# Patient Record
Sex: FEMALE | Race: WHITE | NOT HISPANIC OR LATINO | Employment: FULL TIME
[De-identification: names, ages, dates, MRNs, and addresses within clinical notes are randomized per-mention and may not be internally consistent; named-entity substitution may affect disease eponyms.]

---

## 2018-06-25 ENCOUNTER — RECORDS - HEALTHEAST (OUTPATIENT)
Dept: ADMINISTRATIVE | Facility: OTHER | Age: 33
End: 2018-06-25

## 2018-08-09 ENCOUNTER — COMMUNICATION - HEALTHEAST (OUTPATIENT)
Dept: TELEHEALTH | Facility: CLINIC | Age: 33
End: 2018-08-09

## 2018-08-09 ENCOUNTER — HOSPITAL ENCOUNTER (OUTPATIENT)
Dept: RADIOLOGY | Facility: HOSPITAL | Age: 33
Discharge: HOME | End: 2018-08-09
Attending: OBSTETRICS & GYNECOLOGY | Admitting: RADIOLOGY

## 2018-08-09 DIAGNOSIS — N97.9 INFERTILITY, FEMALE: ICD-10-CM

## 2018-11-09 ASSESSMENT — MIFFLIN-ST. JEOR: SCORE: 1613.94

## 2018-11-12 ENCOUNTER — ANESTHESIA - HEALTHEAST (OUTPATIENT)
Dept: SURGERY | Facility: AMBULATORY SURGERY CENTER | Age: 33
End: 2018-11-12

## 2018-11-13 ENCOUNTER — SURGERY - HEALTHEAST (OUTPATIENT)
Dept: SURGERY | Facility: AMBULATORY SURGERY CENTER | Age: 33
End: 2018-11-13

## 2018-11-13 ASSESSMENT — MIFFLIN-ST. JEOR: SCORE: 1613.94

## 2020-08-04 ENCOUNTER — TRANSCRIBE ORDERS (OUTPATIENT)
Dept: MATERNAL FETAL MEDICINE | Facility: CLINIC | Age: 35
End: 2020-08-04

## 2020-08-04 DIAGNOSIS — O26.90 PREGNANCY RELATED CONDITION: Primary | ICD-10-CM

## 2020-08-12 ENCOUNTER — TRANSFERRED RECORDS (OUTPATIENT)
Dept: HEALTH INFORMATION MANAGEMENT | Facility: CLINIC | Age: 35
End: 2020-08-12

## 2020-09-21 ENCOUNTER — TRANSFERRED RECORDS (OUTPATIENT)
Dept: HEALTH INFORMATION MANAGEMENT | Facility: CLINIC | Age: 35
End: 2020-09-21

## 2020-09-22 ENCOUNTER — TRANSCRIBE ORDERS (OUTPATIENT)
Dept: MATERNAL FETAL MEDICINE | Facility: CLINIC | Age: 35
End: 2020-09-22

## 2020-09-22 ENCOUNTER — MEDICAL CORRESPONDENCE (OUTPATIENT)
Dept: HEALTH INFORMATION MANAGEMENT | Facility: CLINIC | Age: 35
End: 2020-09-22

## 2020-09-22 DIAGNOSIS — O26.90 PREGNANCY RELATED CONDITION, ANTEPARTUM: Primary | ICD-10-CM

## 2020-09-24 ENCOUNTER — PRE VISIT (OUTPATIENT)
Dept: MATERNAL FETAL MEDICINE | Facility: CLINIC | Age: 35
End: 2020-09-24

## 2020-09-28 ENCOUNTER — OFFICE VISIT (OUTPATIENT)
Dept: MATERNAL FETAL MEDICINE | Facility: CLINIC | Age: 35
End: 2020-09-28
Attending: OBSTETRICS & GYNECOLOGY
Payer: COMMERCIAL

## 2020-09-28 DIAGNOSIS — O26.90 PREGNANCY RELATED CONDITION, ANTEPARTUM: ICD-10-CM

## 2020-09-28 PROCEDURE — 40000072 ZZH STATISTIC GENETIC COUNSELING, < 16 MIN: Mod: ZF | Performed by: GENETIC COUNSELOR, MS

## 2020-09-28 NOTE — PROGRESS NOTES
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       Impression/Plan:   Saundra was seen for a genetic counseling consultation only today to discuss her pregnancy history as well as her positive carrier screening results. Today we coordinated partner carrier screening through [employer] for her , Luis, which is expected in 2-3 weeks. I will plan to call Saundra to discuss these results and determine if any additional testing (such as prenatal diagnosis) is warranted or desired. Luis provided written permission for me to discuss his results with Saundra.     Pregnancy History:   /Parity:                            Age at Delivery: 35 year old  JEVON: 3/17/2021, by Last Menstrual Period                  Gestational Age: 15w5d  -  No significant complications or exposures were reported in the current pregnancy.  -  Saundra s pregnancy history is significant for:    Two previous miscarriages between 6 and 8 weeks. Saundra had testing on products of conception for the second pregnancy. She reports that the the pregnancy had a chromosome condition, though she is not sure which condition. However, she remembers being told that the finding indicated something sporadic and that it would not be likely to recur. We do not have access to these records.     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 Due to this history, Saundra was initially taking lovenox, but developed a subchorionic hemorrhage. Now, she will be taking baby aspirin for the remainder of the pregnancy.        Family History:   A three-generation pedigree was obtained, and is scanned under the  Media  tab.   The following significant findings  were reported by Saundra:    Saundra's maternal uncle has multiple sclerosis which was diagnosed in his 30s. Multiple sclerosis (MS) is an autoimmune disease of the central nervous system characterized by focal inflammation, demyelination, and axonal injury. MS is thought to be a multifactorial, meaning caused by a combination of environmental and genetic factors. Although we can see multifactorial conditions recur in families, we are unlikely to be able to identify a specific underlying cause. We discussed that this history likely does not increase the chance that Saundra or the pregnancy would develop this condition.       Saundra's , Luis, reports that he has a paternal aunt who recently had to undergo a cardiac valve replacement. She is in her 70s. We discussed that this history would not impact how we manage the pregnancy. Heart conditions are unfortunately very common as people age and unlikely to have an underlying genetic cause unless there are multiple people affected, especially at young ages.     Otherwise, the reported family history is negative for multiple miscarriages, stillbirths, birth defects, intellectual disability, known genetic conditions, and consanguinity.       Carrier Screening:   The patient reports that she and the father of the pregnancy have  ancestry:     Cystic fibrosis is an autosomal recessive genetic condition that occurs with increased frequency in individuals of  ancestry and carrier screening for this condition is available.  In addition,  screening in the [facility] includes cystic fibrosis.      Expanded carrier screening for mutations in a large panel of genes associated with autosomal recessive conditions including cystic fibrosis, spinal muscular atrophy, and others, is now available.      The patient has had previous carrier screening for 144 conditions ([employer]herit[employer] Comprehensive), the results of which were positive for Owens variant  galactosemia.  A copy of the report was available for our review today.    Saundra was found to carry one GALT variant. The variant she carries is associated with the Owens variant form of galactosemia. Classic galactosemia is an autosomal recessive metabolic disorder. Signs and symptoms of untreated classic galactosemia include diarrhea, poor feeing, failure to thrive, jaundice, hepatomegaly, sepsis, lethargy, and bleeding tendencies. However, early management through diet (removing breast milk) can resolve or prevent these symptoms when initiated soon after birth.     Individuals with Owens variant galactosemia, on the other hand, are typically asymptomatic. Infants with Owens variant galactosemia who receive breast milk or a high galactose-containing formula (dairy milk-based formula) are typically asymptomatic and show the same prevalence of acute issues seen in the general  population. Long-term effects from galactosemia, such as premature ovarian failure, are NOT associated with Owens variant galactosemia.     Individuals with Owens variant galactosemia may have two Owens variants or a Owens variant and a classic pathogenic variant. Babies born with Owens variant galactosemia may screen positive on  screening (NBS) due to decreased enzyme activity. However, these babies will not be at risk for classic galactosemia.    Saundra and Luis wanted to proceed with partner testing today. We discussed that if Luis screens positive for either a Owens variant or a classic pathogenic variant, their pregnancy will have a 25% chance of having Owens variant galactosemia and screening positive on NBS. The pregnancy would also have a 50% chance to be a carrier for one of the variants and a 25% chance of carrying neither variant.        Risk Assessment:   We explained that the risk for fetal chromosome abnormalities increases with maternal age. We discussed specific features of common chromosome  abnormalities, including Down syndrome, trisomy 13, trisomy 18, and sex chromosome trisomies.      - At age 35 at midtrimester, the risk to have a baby with Down syndrome is 1 in 274.     - At age 35 at midtrimester, the risk to have a baby with any chromosome abnormality is 1 in 135.     The patient had a Non-Invasive Prenatal Test (NIPT) ([id number] Plus) earlier in pregnancy. The results were negative for trisomy 21, 18, and 13. Sex chromosome abnormalities were not detected. Common microdeletions were not detected. Trisomy 16 and trisomy 22 were not detected.     We discussed that these results are reassuring for those conditions screened. Without information about the specific chromosome condition in Saundra's previous pregnancy, it is difficult to give precise recurrence risk. NIPT has decreased the suspicion for any of the most common aneuploidies. The less common aneuploidies, such as trisomy 8 and trisomy 14, are common causes of early miscarriage. These aneuploidies are not expected to be viable and as such, it is unlikely that Saundra's current pregnancy is affected by one of those conditions. We discussed that additional screening and testing, including ultrasounds and diagnostic testing, remain available to Saundra if she desires more definitive information.     Saundra is anxious about residual risk and asked for written information regarding amniocentesis. We also discussed her upcoming comprehensive ultrasound in depth, including specific soft markers that will be screened.       Testing Options:   We discussed the following options:   Genetic Amniocentesis    Invasive procedure typically performed in the second trimester by which amniotic fluid is obtained for the purpose of chromosome analysis and/or other prenatal genetic analysis    Diagnostic results; >99% sensitivity for fetal chromosome abnormalities    AFAFP measurement tests for open neural tube defects       Comprehensive (Level II) ultrasound:  Detailed ultrasound performed between 18-22 weeks gestation to screen for major birth defects and markers for aneuploidy.      We reviewed the benefits and limitations of this testing.  Screening tests provide a risk assessment specific to the pregnancy for certain fetal chromosome abnormalities, but cannot definitively diagnose or exclude a fetal chromosome abnormality.  Follow-up genetic counseling and consideration of diagnostic testing is recommended with any abnormal screening result.     Diagnostic tests carry inherent risks- including risk of miscarriage- that require careful consideration.  These tests can detect fetal chromosome abnormalities with greater than 99% certainty.  Results can be compromised by maternal cell contamination or mosaicism, and are limited by the resolution of cytogenetic G-banding technology.  There is no screening nor diagnostic test that can detect all forms of birth defects or mental disability.    It was a pleasure to be involved with Saundra s care. Face-to-face time of the meeting was 45 minutes.    Gloria Pearson MS, [facility]  Certified Genetic Counselor  [facility]  Maternal Fetal Medicine  [email or web address]  [phone]    Patient seen, evaluated and discussed with the Genetic Counseling Intern. I have verified the content of the note, which accurately reflects my assessment of the patient and the plan of care.  Supervising Genetic Counselor    Rosetta Freire MS, [facility]  Maternal Fetal Medicine  [facility]  Ph: [phone]  [email or web address]

## 2020-09-29 ENCOUNTER — TRANSFERRED RECORDS (OUTPATIENT)
Dept: HEALTH INFORMATION MANAGEMENT | Facility: CLINIC | Age: 35
End: 2020-09-29

## 2020-10-07 ENCOUNTER — TRANSFERRED RECORDS (OUTPATIENT)
Dept: HEALTH INFORMATION MANAGEMENT | Facility: CLINIC | Age: 35
End: 2020-10-07

## 2020-10-12 ENCOUNTER — TELEPHONE (OUTPATIENT)
Dept: MATERNAL FETAL MEDICINE | Facility: CLINIC | Age: 35
End: 2020-10-12

## 2020-10-12 NOTE — TELEPHONE ENCOUNTER
2020     Spoke with Saundra about Armando's carrier screening results for galactosemia. I met with Armando and Saundra because Saundra was found to be a Owens variant galactosemia carrier. We discussed at length that the pregnancy is not at risk for classic galactosemia. Armando opted to pursue galactosemia carrier screening in order to have the full picture. Armando screened negative and was not found to be a carrier of galactosemia. I discussed with Saundra that the pregnancy is likely not at risk for screening positive on  screening for galactosemia. The pregnancy is not at increased risk for either Owens variant galactosemia OR classic galactosemia.     Gloria Pearson MS, [facility]  Certified Genetic Counselor  [facility]  Maternal Fetal Medicine  [email or web address]  [phone]

## 2020-10-22 ENCOUNTER — HOSPITAL ENCOUNTER (OUTPATIENT)
Dept: ULTRASOUND IMAGING | Facility: CLINIC | Age: 35
Discharge: HOME | End: 2020-10-22
Attending: OBSTETRICS & GYNECOLOGY | Admitting: OBSTETRICS & GYNECOLOGY
Payer: COMMERCIAL

## 2020-10-22 ENCOUNTER — OFFICE VISIT (OUTPATIENT)
Dept: MATERNAL FETAL MEDICINE | Facility: CLINIC | Age: 35
End: 2020-10-22
Attending: OBSTETRICS & GYNECOLOGY
Payer: COMMERCIAL

## 2020-10-22 DIAGNOSIS — O26.90 PREGNANCY RELATED CONDITION, ANTEPARTUM: ICD-10-CM

## 2020-10-22 DIAGNOSIS — O26.90 PREGNANCY RELATED CONDITION: ICD-10-CM

## 2020-10-22 PROCEDURE — 99207 PR NO CHARGE LOS: CPT | Performed by: OBSTETRICS & GYNECOLOGY

## 2020-10-22 PROCEDURE — 76811 OB US DETAILED SNGL FETUS: CPT | Mod: 26 | Performed by: OBSTETRICS & GYNECOLOGY

## 2020-10-22 PROCEDURE — 76811 OB US DETAILED SNGL FETUS: CPT

## 2020-10-22 NOTE — PROGRESS NOTES
Please see ultrasound report under Imaging tab for details of today's ultrasound.    Sunshine Quiroga MD  Maternal-Fetal Medicine

## 2020-11-19 ENCOUNTER — HOSPITAL ENCOUNTER (OUTPATIENT)
Dept: ULTRASOUND IMAGING | Facility: CLINIC | Age: 35
End: 2020-11-19
Attending: OBSTETRICS & GYNECOLOGY
Payer: COMMERCIAL

## 2020-11-19 ENCOUNTER — OFFICE VISIT (OUTPATIENT)
Dept: MATERNAL FETAL MEDICINE | Facility: CLINIC | Age: 35
End: 2020-11-19
Attending: OBSTETRICS & GYNECOLOGY
Payer: COMMERCIAL

## 2020-11-19 DIAGNOSIS — O35.9XX0 FETAL ABNORMALITY AFFECTING MANAGEMENT OF MOTHER, SINGLE OR UNSPECIFIED FETUS: Primary | ICD-10-CM

## 2020-11-19 PROCEDURE — 76816 OB US FOLLOW-UP PER FETUS: CPT | Mod: 26 | Performed by: OBSTETRICS & GYNECOLOGY

## 2020-11-19 PROCEDURE — 76816 OB US FOLLOW-UP PER FETUS: CPT

## 2020-11-19 NOTE — PROGRESS NOTES
Please see full imaging report from [employer] program under imaging tab.      Dwaine Cardoza MD  Maternal Fetal Medicine

## 2020-12-07 ENCOUNTER — TRANSFERRED RECORDS (OUTPATIENT)
Dept: HEALTH INFORMATION MANAGEMENT | Facility: CLINIC | Age: 35
End: 2020-12-07

## 2020-12-08 ENCOUNTER — MEDICAL CORRESPONDENCE (OUTPATIENT)
Dept: HEALTH INFORMATION MANAGEMENT | Facility: CLINIC | Age: 35
End: 2020-12-08

## 2020-12-11 ENCOUNTER — HOSPITAL ENCOUNTER (OUTPATIENT)
Dept: CARDIOLOGY | Facility: CLINIC | Age: 35
Discharge: HOME | End: 2020-12-11
Attending: OBSTETRICS & GYNECOLOGY | Admitting: OBSTETRICS & GYNECOLOGY
Payer: COMMERCIAL

## 2020-12-11 DIAGNOSIS — R06.02 SOB (SHORTNESS OF BREATH): ICD-10-CM

## 2020-12-11 DIAGNOSIS — Z3A.25 25 WEEKS GESTATION OF PREGNANCY: ICD-10-CM

## 2020-12-11 PROBLEM — I82.819 SUPERFICIAL THROMBOSIS OF LEG: Status: ACTIVE | Noted: 2020-12-11

## 2020-12-11 PROBLEM — N94.3 PMS (PREMENSTRUAL SYNDROME): Status: ACTIVE | Noted: 2020-12-11

## 2020-12-11 PROBLEM — N83.209 OVARIAN CYST: Status: ACTIVE | Noted: 2020-12-11

## 2020-12-11 PROBLEM — F41.9 ANXIETY: Status: ACTIVE | Noted: 2020-12-11

## 2020-12-11 PROBLEM — G43.909 MIGRAINE: Status: ACTIVE | Noted: 2020-12-11

## 2020-12-11 PROBLEM — E78.5 HYPERLIPIDEMIA LDL GOAL <130: Status: ACTIVE | Noted: 2020-12-11

## 2020-12-11 PROCEDURE — 93306 TTE W/DOPPLER COMPLETE: CPT

## 2020-12-11 PROCEDURE — 93306 TTE W/DOPPLER COMPLETE: CPT | Mod: 26 | Performed by: INTERNAL MEDICINE

## 2020-12-14 ENCOUNTER — OFFICE VISIT (OUTPATIENT)
Dept: CARDIOLOGY | Facility: CLINIC | Age: 35
End: 2020-12-14
Payer: COMMERCIAL

## 2020-12-14 VITALS
BODY MASS INDEX: 38.04 KG/M2 | HEART RATE: 120 BPM | DIASTOLIC BLOOD PRESSURE: 91 MMHG | WEIGHT: 236.7 LBS | HEIGHT: 66 IN | SYSTOLIC BLOOD PRESSURE: 136 MMHG

## 2020-12-14 DIAGNOSIS — E78.5 HYPERLIPIDEMIA LDL GOAL <130: Primary | ICD-10-CM

## 2020-12-14 PROCEDURE — 93005 ELECTROCARDIOGRAM TRACING: CPT | Performed by: INTERNAL MEDICINE

## 2020-12-14 PROCEDURE — 99203 OFFICE O/P NEW LOW 30 MIN: CPT | Performed by: INTERNAL MEDICINE

## 2020-12-14 RX ORDER — ASPIRIN 81 MG/1
81 TABLET ORAL DAILY
COMMUNITY

## 2020-12-14 RX ORDER — CETIRIZINE HYDROCHLORIDE 10 MG/1
10 TABLET ORAL DAILY
COMMUNITY

## 2020-12-14 RX ORDER — LANSOPRAZOLE 30 MG/1
30 CAPSULE, DELAYED RELEASE ORAL DAILY
COMMUNITY

## 2020-12-14 RX ORDER — ERGOCALCIFEROL 1.25 MG/1
4000 CAPSULE, LIQUID FILLED ORAL DAILY
COMMUNITY

## 2020-12-14 RX ORDER — FLUTICASONE PROPIONATE 50 MCG
1 SPRAY, SUSPENSION (ML) NASAL DAILY
COMMUNITY

## 2020-12-14 RX ORDER — SERTRALINE HYDROCHLORIDE 25 MG/1
25 TABLET, FILM COATED ORAL DAILY
COMMUNITY

## 2020-12-14 RX ORDER — LANOLIN ALCOHOL/MO/W.PET/CERES
1000 CREAM (GRAM) TOPICAL DAILY
COMMUNITY

## 2020-12-14 ASSESSMENT — MIFFLIN-ST. JEOR: SCORE: 1777.47

## 2020-12-14 NOTE — LETTER
12/14/2020    Stacie Glez MD  [facility] [address] [place] [address]  [facility] [id number]    RE: Saundra Cronin       Dear Colleague,    I had the pleasure of seeing Saundra Cronin in the [facility].    HPI and Plan:   Today I had the pleasure of seeing Saundra Cronin at [facility]. She is a pleasant 35 year old patient with a past medical history of migraines, currently pregnant who presents to the clinic in consultation for shortness of breath.  The patient is a 25 weeks pregnant with her first baby girl.    She tells me that over the last 10 days she has noticed significant shortness of breath which is around-the-clock.  This is also made worse by extreme anxiety that she has developed.  She wakes up at night feeling anxious and has to either watch TV for long.  Time of just walk around in the house to feel better.  She started Zoloft few days ago and has definitely noticed an improvement in her symptoms since.  Last 2 days she was able to sleep better.  She realizes that her symptoms are most likely secondary to being seen very anxious.  Even in clinic today she feels very nervous and anxious.  She says that her heart rate in clinic is elevated because she is very nervous about our encounter.      Echocardiogram performed recently showed normal biventricular size and systolic function and no valvular disease.    Assessment and plan  1.  Severe anxiety-improvement in symptoms after starting Zoloft  2.  Shortness of breath secondary to 1    I provided reassurance and told the patient that her echocardiogram is completely normal and shows no evidence of structural or valvular heart disease.  She was relieved to know that.  I told her to consider things like medication in addition to taking medications.  She has not returned for medication since that it helps to calm her down and improve her symptoms.  I also told her to stay well-hydrated and drink  enough water.  I believe we need to do anything further at this time.  We will have her come back and see us on as-needed basis.  If her symptoms do not improve after termination of pregnancy I will undertake comprehensive work-up.  But for now we will hold off on further testing.    Thank you for allowing me to participate in the care of Saundra Cronin    This note was completed in part using Dragon voice recognition software. Although reviewed after completion, some word and grammatical errors may occur.    Ivan Martinez MD  Cardiology    Orders Placed This Encounter   Procedures     EKG 12-lead complete w/read - Clinics (performed today)       Orders Placed This Encounter   Medications     vitamin D2 (ERGOCALCIFEROL) 15816 units (1250 mcg) capsule     Sig: Take 4,000 Units by mouth daily     cyanocobalamin (VITAMIN B-12) 1000 MCG tablet     Sig: Take 1,000 mcg by mouth daily     cetirizine (ZYRTEC) 10 MG tablet     Sig: Take 10 mg by mouth daily     fluticasone (FLONASE) 50 MCG/ACT nasal spray     Sig: Spray 1 spray into both nostrils daily     aspirin 81 MG EC tablet     Sig: Take 81 mg by mouth daily     LANsoprazole (PREVACID) 30 MG DR capsule     Sig: Take 30 mg by mouth daily     sertraline (ZOLOFT) 25 MG tablet     Sig: Take 25 mg by mouth daily       There are no discontinued medications.    Encounter Diagnosis   Name Primary?     Hyperlipidemia LDL goal <130 Yes       CURRENT MEDICATIONS:  Current Outpatient Medications   Medication Sig Dispense Refill     aspirin 81 MG EC tablet Take 81 mg by mouth daily       cetirizine (ZYRTEC) 10 MG tablet Take 10 mg by mouth daily       cyanocobalamin (VITAMIN B-12) 1000 MCG tablet Take 1,000 mcg by mouth daily       fluticasone (FLONASE) 50 MCG/ACT nasal spray Spray 1 spray into both nostrils daily       LANsoprazole (PREVACID) 30 MG DR capsule Take 30 mg by mouth daily       sertraline (ZOLOFT) 25 MG tablet Take 25 mg by mouth daily       vitamin D2  (ERGOCALCIFEROL) 48494 units (1250 mcg) capsule Take 4,000 Units by mouth daily         ALLERGIES   Not on File    PAST MEDICAL HISTORY:  Past Medical History:   Diagnosis Date     Anxiety 12/11/2020     Hyperlipidemia LDL goal <130 12/11/2020     Migraine 12/11/2020     Superficial thrombosis of leg 12/11/2020       PAST SURGICAL HISTORY:  Past Surgical History:   Procedure Laterality Date     DILATION AND CURETTAGE       TONSILLECTOMY         FAMILY HISTORY:  No family history on file.    SOCIAL HISTORY:  Social History     Socioeconomic History     Marital status:      Spouse name: None     Number of children: None     Years of education: None     Highest education level: None   Occupational History     None   Social Needs     Financial resource strain: None     Food insecurity     Worry: None     Inability: None     Transportation needs     Medical: None     Non-medical: None   Tobacco Use     Smoking status: Never Smoker     Smokeless tobacco: Never Used   Substance and Sexual Activity     Alcohol use: Not Currently     Drug use: Never     Sexual activity: None   Lifestyle     Physical activity     Days per week: None     Minutes per session: None     Stress: None   Relationships     Social connections     Talks on phone: None     Gets together: None     Attends Restorationist service: None     Active member of club or organization: None     Attends meetings of clubs or organizations: None     Relationship status: None     Intimate partner violence     Fear of current or ex partner: None     Emotionally abused: None     Physically abused: None     Forced sexual activity: None   Other Topics Concern     None   Social History Narrative     None       Review of Systems:  Skin:  Negative       Eyes:  Negative      ENT:  Negative      Respiratory:  Negative       Cardiovascular:    Positive for;edema    Gastroenterology: Negative      Genitourinary:  Negative      Musculoskeletal:  Negative      Neurologic:   "Positive for numbness or tingling of hands(tingling in arms)    Psychiatric:  Positive for anxiety    Heme/Lymph/Imm:  Negative      Endocrine:  Negative        Physical Exam:  Vitals: BP (!) 136/91   Pulse 120   Ht 1.664 m (5' 5.5\")   Wt 107.4 kg (236 lb 11.2 oz)   LMP 06/10/2020   BMI 38.79 kg/m    Constitutional: awake, alert, no distress  Head: Normocephalic, atraumatic  Eyes: Conjunctivae and lids unremarkable, sclera white  ENT: No pallor or cyanosis  Respiratory: Good chest movement, no distress  Cardiac: Regular rate and rhythm, S1-S2 normal. No murmurs gallops or rubs. No pedal edema.   Extremities and musculoskeletal: No gross motor deficit  Neurological.  Affect normal  Psych: Alert and oriented x3    Recent Lab Results:  LIPID RESULTS:  No results found for: CHOL, HDL, LDL, TRIG, CHOLHDLRATIO    LIVER ENZYME RESULTS:  No results found for: AST, ALT    CBC RESULTS:  No results found for: WBC, RBC, HGB, HCT, MCV, MCH, MCHC, RDW, PLT    BMP RESULTS:  No results found for: NA, POTASSIUM, CHLORIDE, CO2, ANIONGAP, GLC, BUN, CR, GFRESTIMATED, GFRESTBLACK, JESUS     A1C RESULTS:  No results found for: A1C    INR RESULTS:  No results found for: INR      All medical records were reviewed in detail and discussed with the patient. Greater than 30 mins were spent with the patient, 50% of this time was spent on counseling and coordination of care.  After visit summary was printed and given to the patient.      Thank you for allowing me to participate in the care of your patient.    Sincerely,     Ivan Martinez MD     [facility]  "

## 2020-12-14 NOTE — PROGRESS NOTES
HPI and Plan:   Today I had the pleasure of seeing Saundra Cronin at [facility]. She is a pleasant 35 year old patient with a past medical history of migraines, currently pregnant who presents to the clinic in consultation for shortness of breath.  The patient is a 25 weeks pregnant with her first baby girl.    She tells me that over the last 10 days she has noticed significant shortness of breath which is around-the-clock.  This is also made worse by extreme anxiety that she has developed.  She wakes up at night feeling anxious and has to either watch TV for long.  Time of just walk around in the house to feel better.  She started Zoloft few days ago and has definitely noticed an improvement in her symptoms since.  Last 2 days she was able to sleep better.  She realizes that her symptoms are most likely secondary to being seen very anxious.  Even in clinic today she feels very nervous and anxious.  She says that her heart rate in clinic is elevated because she is very nervous about our encounter.      Echocardiogram performed recently showed normal biventricular size and systolic function and no valvular disease.    Assessment and plan  1.  Severe anxiety-improvement in symptoms after starting Zoloft  2.  Shortness of breath secondary to 1    I provided reassurance and told the patient that her echocardiogram is completely normal and shows no evidence of structural or valvular heart disease.  She was relieved to know that.  I told her to consider things like medication in addition to taking medications.  She has not returned for medication since that it helps to calm her down and improve her symptoms.  I also told her to stay well-hydrated and drink enough water.  I believe we need to do anything further at this time.  We will have her come back and see us on as-needed basis.  If her symptoms do not improve after termination of pregnancy I will undertake comprehensive work-up.  But for now  we will hold off on further testing.    Thank you for allowing me to participate in the care of Saundra Cronin    This note was completed in part using Dragon voice recognition software. Although reviewed after completion, some word and grammatical errors may occur.    Ivan Martinez MD  Cardiology    Orders Placed This Encounter   Procedures     EKG 12-lead complete w/read - Clinics (performed today)       Orders Placed This Encounter   Medications     vitamin D2 (ERGOCALCIFEROL) 92078 units (1250 mcg) capsule     Sig: Take 4,000 Units by mouth daily     cyanocobalamin (VITAMIN B-12) 1000 MCG tablet     Sig: Take 1,000 mcg by mouth daily     cetirizine (ZYRTEC) 10 MG tablet     Sig: Take 10 mg by mouth daily     fluticasone (FLONASE) 50 MCG/ACT nasal spray     Sig: Spray 1 spray into both nostrils daily     aspirin 81 MG EC tablet     Sig: Take 81 mg by mouth daily     LANsoprazole (PREVACID) 30 MG DR capsule     Sig: Take 30 mg by mouth daily     sertraline (ZOLOFT) 25 MG tablet     Sig: Take 25 mg by mouth daily       There are no discontinued medications.    Encounter Diagnosis   Name Primary?     Hyperlipidemia LDL goal <130 Yes       CURRENT MEDICATIONS:  Current Outpatient Medications   Medication Sig Dispense Refill     aspirin 81 MG EC tablet Take 81 mg by mouth daily       cetirizine (ZYRTEC) 10 MG tablet Take 10 mg by mouth daily       cyanocobalamin (VITAMIN B-12) 1000 MCG tablet Take 1,000 mcg by mouth daily       fluticasone (FLONASE) 50 MCG/ACT nasal spray Spray 1 spray into both nostrils daily       LANsoprazole (PREVACID) 30 MG DR capsule Take 30 mg by mouth daily       sertraline (ZOLOFT) 25 MG tablet Take 25 mg by mouth daily       vitamin D2 (ERGOCALCIFEROL) 42223 units (1250 mcg) capsule Take 4,000 Units by mouth daily         ALLERGIES   Not on File    PAST MEDICAL HISTORY:  Past Medical History:   Diagnosis Date     Anxiety 12/11/2020     Hyperlipidemia LDL goal <130 12/11/2020     Migraine  "12/11/2020     Superficial thrombosis of leg 12/11/2020       PAST SURGICAL HISTORY:  Past Surgical History:   Procedure Laterality Date     DILATION AND CURETTAGE       TONSILLECTOMY         FAMILY HISTORY:  No family history on file.    SOCIAL HISTORY:  Social History     Socioeconomic History     Marital status:      Spouse name: None     Number of children: None     Years of education: None     Highest education level: None   Occupational History     None   Social Needs     Financial resource strain: None     Food insecurity     Worry: None     Inability: None     Transportation needs     Medical: None     Non-medical: None   Tobacco Use     Smoking status: Never Smoker     Smokeless tobacco: Never Used   Substance and Sexual Activity     Alcohol use: Not Currently     Drug use: Never     Sexual activity: None   Lifestyle     Physical activity     Days per week: None     Minutes per session: None     Stress: None   Relationships     Social connections     Talks on phone: None     Gets together: None     Attends Zoroastrian service: None     Active member of club or organization: None     Attends meetings of clubs or organizations: None     Relationship status: None     Intimate partner violence     Fear of current or ex partner: None     Emotionally abused: None     Physically abused: None     Forced sexual activity: None   Other Topics Concern     None   Social History Narrative     None       Review of Systems:  Skin:  Negative       Eyes:  Negative      ENT:  Negative      Respiratory:  Negative       Cardiovascular:    Positive for;edema    Gastroenterology: Negative      Genitourinary:  Negative      Musculoskeletal:  Negative      Neurologic:  Positive for numbness or tingling of hands(tingling in arms)    Psychiatric:  Positive for anxiety    Heme/Lymph/Imm:  Negative      Endocrine:  Negative        Physical Exam:  Vitals: BP (!) 136/91   Pulse 120   Ht 1.664 m (5' 5.5\")   Wt 107.4 kg (236 lb " 11.2 oz)   LMP 06/10/2020   BMI 38.79 kg/m    Constitutional: awake, alert, no distress  Head: Normocephalic, atraumatic  Eyes: Conjunctivae and lids unremarkable, sclera white  ENT: No pallor or cyanosis  Respiratory: Good chest movement, no distress  Cardiac: Regular rate and rhythm, S1-S2 normal. No murmurs gallops or rubs. No pedal edema.   Extremities and musculoskeletal: No gross motor deficit  Neurological.  Affect normal  Psych: Alert and oriented x3    Recent Lab Results:  LIPID RESULTS:  No results found for: CHOL, HDL, LDL, TRIG, CHOLHDLRATIO    LIVER ENZYME RESULTS:  No results found for: AST, ALT    CBC RESULTS:  No results found for: WBC, RBC, HGB, HCT, MCV, MCH, MCHC, RDW, PLT    BMP RESULTS:  No results found for: NA, POTASSIUM, CHLORIDE, CO2, ANIONGAP, GLC, BUN, CR, GFRESTIMATED, GFRESTBLACK, JESUS     A1C RESULTS:  No results found for: A1C    INR RESULTS:  No results found for: INR    CC  No referring provider defined for this encounter.    All medical records were reviewed in detail and discussed with the patient. Greater than 30 mins were spent with the patient, 50% of this time was spent on counseling and coordination of care.  After visit summary was printed and given to the patient.

## 2020-12-14 NOTE — LETTER
12/14/2020    Stacie Glez MD  [facility] [address] [place] [address]  [facility] [address]    RE: Saundra Cronin       Dear Colleague,    I had the pleasure of seeing Saundra Cronin in the [facility].    HPI and Plan:   Today I had the pleasure of seeing Saundra Cronin at [facility]. She is a pleasant 35 year old patient with a past medical history of migraines, currently pregnant who presents to the clinic in consultation for shortness of breath.  The patient is a 25 weeks pregnant with her first baby girl.    She tells me that over the last 10 days she has noticed significant shortness of breath which is around-the-clock.  This is also made worse by extreme anxiety that she has developed.  She wakes up at night feeling anxious and has to either watch TV for long.  Time of just walk around in the house to feel better.  She started Zoloft few days ago and has definitely noticed an improvement in her symptoms since.  Last 2 days she was able to sleep better.  She realizes that her symptoms are most likely secondary to being seen very anxious.  Even in clinic today she feels very nervous and anxious.  She says that her heart rate in clinic is elevated because she is very nervous about our encounter.      Echocardiogram performed recently showed normal biventricular size and systolic function and no valvular disease.    Assessment and plan  1.  Severe anxiety-improvement in symptoms after starting Zoloft  2.  Shortness of breath secondary to 1    I provided reassurance and told the patient that her echocardiogram is completely normal and shows no evidence of structural or valvular heart disease.  She was relieved to know that.  I told her to consider things like medication in addition to taking medications.  She has not returned for medication since that it helps to calm her down and improve her symptoms.  I also told her to stay well-hydrated and drink  enough water.  I believe we need to do anything further at this time.  We will have her come back and see us on as-needed basis.  If her symptoms do not improve after termination of pregnancy I will undertake comprehensive work-up.  But for now we will hold off on further testing.    Thank you for allowing me to participate in the care of Saundra Cronin    This note was completed in part using Dragon voice recognition software. Although reviewed after completion, some word and grammatical errors may occur.    Ivan Martinez MD  Cardiology    Orders Placed This Encounter   Procedures     EKG 12-lead complete w/read - Clinics (performed today)       Orders Placed This Encounter   Medications     vitamin D2 (ERGOCALCIFEROL) 75836 units (1250 mcg) capsule     Sig: Take 4,000 Units by mouth daily     cyanocobalamin (VITAMIN B-12) 1000 MCG tablet     Sig: Take 1,000 mcg by mouth daily     cetirizine (ZYRTEC) 10 MG tablet     Sig: Take 10 mg by mouth daily     fluticasone (FLONASE) 50 MCG/ACT nasal spray     Sig: Spray 1 spray into both nostrils daily     aspirin 81 MG EC tablet     Sig: Take 81 mg by mouth daily     LANsoprazole (PREVACID) 30 MG DR capsule     Sig: Take 30 mg by mouth daily     sertraline (ZOLOFT) 25 MG tablet     Sig: Take 25 mg by mouth daily       There are no discontinued medications.    Encounter Diagnosis   Name Primary?     Hyperlipidemia LDL goal <130 Yes       CURRENT MEDICATIONS:  Current Outpatient Medications   Medication Sig Dispense Refill     aspirin 81 MG EC tablet Take 81 mg by mouth daily       cetirizine (ZYRTEC) 10 MG tablet Take 10 mg by mouth daily       cyanocobalamin (VITAMIN B-12) 1000 MCG tablet Take 1,000 mcg by mouth daily       fluticasone (FLONASE) 50 MCG/ACT nasal spray Spray 1 spray into both nostrils daily       LANsoprazole (PREVACID) 30 MG DR capsule Take 30 mg by mouth daily       sertraline (ZOLOFT) 25 MG tablet Take 25 mg by mouth daily       vitamin D2  (ERGOCALCIFEROL) 30046 units (1250 mcg) capsule Take 4,000 Units by mouth daily         ALLERGIES   Not on File    PAST MEDICAL HISTORY:  Past Medical History:   Diagnosis Date     Anxiety 12/11/2020     Hyperlipidemia LDL goal <130 12/11/2020     Migraine 12/11/2020     Superficial thrombosis of leg 12/11/2020       PAST SURGICAL HISTORY:  Past Surgical History:   Procedure Laterality Date     DILATION AND CURETTAGE       TONSILLECTOMY         FAMILY HISTORY:  No family history on file.    SOCIAL HISTORY:  Social History     Socioeconomic History     Marital status:      Spouse name: None     Number of children: None     Years of education: None     Highest education level: None   Occupational History     None   Social Needs     Financial resource strain: None     Food insecurity     Worry: None     Inability: None     Transportation needs     Medical: None     Non-medical: None   Tobacco Use     Smoking status: Never Smoker     Smokeless tobacco: Never Used   Substance and Sexual Activity     Alcohol use: Not Currently     Drug use: Never     Sexual activity: None   Lifestyle     Physical activity     Days per week: None     Minutes per session: None     Stress: None   Relationships     Social connections     Talks on phone: None     Gets together: None     Attends Gnosticist service: None     Active member of club or organization: None     Attends meetings of clubs or organizations: None     Relationship status: None     Intimate partner violence     Fear of current or ex partner: None     Emotionally abused: None     Physically abused: None     Forced sexual activity: None   Other Topics Concern     None   Social History Narrative     None       Review of Systems:  Skin:  Negative       Eyes:  Negative      ENT:  Negative      Respiratory:  Negative       Cardiovascular:    Positive for;edema    Gastroenterology: Negative      Genitourinary:  Negative      Musculoskeletal:  Negative      Neurologic:   "Positive for numbness or tingling of hands(tingling in arms)    Psychiatric:  Positive for anxiety    Heme/Lymph/Imm:  Negative      Endocrine:  Negative        Physical Exam:  Vitals: BP (!) 136/91   Pulse 120   Ht 1.664 m (5' 5.5\")   Wt 107.4 kg (236 lb 11.2 oz)   LMP 06/10/2020   BMI 38.79 kg/m    Constitutional: awake, alert, no distress  Head: Normocephalic, atraumatic  Eyes: Conjunctivae and lids unremarkable, sclera white  ENT: No pallor or cyanosis  Respiratory: Good chest movement, no distress  Cardiac: Regular rate and rhythm, S1-S2 normal. No murmurs gallops or rubs. No pedal edema.   Extremities and musculoskeletal: No gross motor deficit  Neurological.  Affect normal  Psych: Alert and oriented x3    Recent Lab Results:  LIPID RESULTS:  No results found for: CHOL, HDL, LDL, TRIG, CHOLHDLRATIO    LIVER ENZYME RESULTS:  No results found for: AST, ALT    CBC RESULTS:  No results found for: WBC, RBC, HGB, HCT, MCV, MCH, MCHC, RDW, PLT    BMP RESULTS:  No results found for: NA, POTASSIUM, CHLORIDE, CO2, ANIONGAP, GLC, BUN, CR, GFRESTIMATED, GFRESTBLACK, JESUS     A1C RESULTS:  No results found for: A1C    INR RESULTS:  No results found for: INR    CC  No referring provider defined for this encounter.    All medical records were reviewed in detail and discussed with the patient. Greater than 30 mins were spent with the patient, 50% of this time was spent on counseling and coordination of care.  After visit summary was printed and given to the patient.        Thank you for allowing me to participate in the care of your patient.      Sincerely,     Ivan Martinez MD     [facility]    cc:   No referring provider defined for this encounter.        "

## 2021-01-27 ENCOUNTER — HOSPITAL ENCOUNTER (OUTPATIENT)
Facility: CLINIC | Age: 36
Discharge: HOME | End: 2021-01-27
Attending: OBSTETRICS & GYNECOLOGY | Admitting: OBSTETRICS & GYNECOLOGY
Payer: COMMERCIAL

## 2021-01-27 VITALS
BODY MASS INDEX: 38.79 KG/M2 | DIASTOLIC BLOOD PRESSURE: 74 MMHG | SYSTOLIC BLOOD PRESSURE: 147 MMHG | TEMPERATURE: 99 F | HEIGHT: 66 IN

## 2021-01-27 LAB
ALT SERPL W P-5'-P-CCNC: 22 U/L (ref 0–50)
AST SERPL W P-5'-P-CCNC: 14 U/L (ref 0–45)
CREAT SERPL-MCNC: 0.66 MG/DL (ref 0.52–1.04)
CREAT UR-MCNC: 213 MG/DL
ERYTHROCYTE [DISTWIDTH] IN BLOOD BY AUTOMATED COUNT: 12.9 % (ref 10–15)
GFR SERPL CREATININE-BSD FRML MDRD: >90 ML/MIN/{1.73_M2}
HCT VFR BLD AUTO: 31.5 % (ref 35–47)
HGB BLD-MCNC: 10.4 G/DL (ref 11.7–15.7)
MCH RBC QN AUTO: 27.7 PG (ref 26.5–33)
MCHC RBC AUTO-ENTMCNC: 33 G/DL (ref 31.5–36.5)
MCV RBC AUTO: 84 FL (ref 78–100)
PLATELET # BLD AUTO: 278 10E9/L (ref 150–450)
PROT UR-MCNC: 0.72 G/L
PROT/CREAT 24H UR: 0.34 G/G CR (ref 0–0.2)
RBC # BLD AUTO: 3.76 10E12/L (ref 3.8–5.2)
WBC # BLD AUTO: 9.9 10E9/L (ref 4–11)

## 2021-01-27 PROCEDURE — 59025 FETAL NON-STRESS TEST: CPT

## 2021-01-27 PROCEDURE — 84156 ASSAY OF PROTEIN URINE: CPT | Performed by: OBSTETRICS & GYNECOLOGY

## 2021-01-27 PROCEDURE — 36415 COLL VENOUS BLD VENIPUNCTURE: CPT | Performed by: OBSTETRICS & GYNECOLOGY

## 2021-01-27 PROCEDURE — 250N000011 HC RX IP 250 OP 636

## 2021-01-27 PROCEDURE — 82565 ASSAY OF CREATININE: CPT | Performed by: OBSTETRICS & GYNECOLOGY

## 2021-01-27 PROCEDURE — 250N000013 HC RX MED GY IP 250 OP 250 PS 637

## 2021-01-27 PROCEDURE — 84460 ALANINE AMINO (ALT) (SGPT): CPT | Performed by: OBSTETRICS & GYNECOLOGY

## 2021-01-27 PROCEDURE — 85027 COMPLETE CBC AUTOMATED: CPT | Performed by: OBSTETRICS & GYNECOLOGY

## 2021-01-27 PROCEDURE — G0463 HOSPITAL OUTPT CLINIC VISIT: HCPCS | Mod: 25

## 2021-01-27 PROCEDURE — 84450 TRANSFERASE (AST) (SGOT): CPT | Performed by: OBSTETRICS & GYNECOLOGY

## 2021-01-27 RX ORDER — BETAMETHASONE SODIUM PHOSPHATE AND BETAMETHASONE ACETATE 3; 3 MG/ML; MG/ML
12 INJECTION, SUSPENSION INTRA-ARTICULAR; INTRALESIONAL; INTRAMUSCULAR; SOFT TISSUE EVERY 12 HOURS
Status: DISCONTINUED | OUTPATIENT
Start: 2021-01-27 | End: 2021-01-27 | Stop reason: HOSPADM

## 2021-01-27 RX ORDER — NIFEDIPINE 10 MG/1
10 CAPSULE ORAL
Status: DISCONTINUED | OUTPATIENT
Start: 2021-01-27 | End: 2021-01-27 | Stop reason: HOSPADM

## 2021-01-27 RX ORDER — NIFEDIPINE 10 MG/1
CAPSULE ORAL
Status: COMPLETED
Start: 2021-01-27 | End: 2021-01-27

## 2021-01-27 RX ORDER — BETAMETHASONE SODIUM PHOSPHATE AND BETAMETHASONE ACETATE 3; 3 MG/ML; MG/ML
INJECTION, SUSPENSION INTRA-ARTICULAR; INTRALESIONAL; INTRAMUSCULAR; SOFT TISSUE
Status: COMPLETED
Start: 2021-01-27 | End: 2021-01-27

## 2021-01-27 RX ADMIN — NIFEDIPINE 10 MG: 10 CAPSULE ORAL at 14:38

## 2021-01-27 RX ADMIN — BETAMETHASONE SODIUM PHOSPHATE AND BETAMETHASONE ACETATE 12 MG: 3; 3 INJECTION, SUSPENSION INTRA-ARTICULAR; INTRALESIONAL; INTRAMUSCULAR; SOFT TISSUE at 14:14

## 2021-01-27 RX ADMIN — BETAMETHASONE SODIUM PHOSPHATE AND BETAMETHASONE ACETATE 12 MG: 3; 3 INJECTION, SUSPENSION INTRA-ARTICULAR; INTRALESIONAL; INTRAMUSCULAR at 14:14

## 2021-01-27 NOTE — PLAN OF CARE
"Pt arrives to maternal assessment center after checking her BP at home. Pt reports BP was 170/100 when she woke up this morning. Pt denies headache, RUQ pain, edema, reports once a week \"floater\" in her vision which she says is not new. Pt has history of anxiety and takes zoloft. Denies feeling contractions, leaking of fluid, vaginal bleeding, +FM.     EFM monitors applied. Prenatal and medical history reviewed with pt. Pt's sister at bedside. BP's started cycled q 10 min. Dr Fitch updated on pt's arrival and findings, orders for pre-eclampsia labs. Report to Lexi ANDREA RN to resume care.  "

## 2021-01-27 NOTE — PROVIDER NOTIFICATION
1356:  Dr. Fitch updated on BP's and labs.  MD wants pt to get betamethasone and she plans on coming to [facility] to speak with pt.  1400:  MD at bedside. Pt highly anxious and had elevated BP r/t her discussion with MD.   MD wants pt to keep appt tomorrow with Dr. Glez and they will develop a POC r/t her BP's.  Pt able to return to [facility] for repeat betamethasone tomorrow, unless she is able to receive it at her OB appt (per Dr. Fitch).    1415:  1st betamethasone dose given.  BP elevated r/t pt receiving injection.  Encouraged pt to try to relax prior to discharge to home.

## 2021-01-27 NOTE — PROGRESS NOTES
"TRiage assessment    S: Saundra Cronin is a 35 year old  at 33w0d who presents for home blood pressure measurement of 170/100 when she woke up this morning.  She repeated the measurement and it continued to be elevated so she was referred to triage.  She denies HA, vision changes, feeling ill.  She actually has been feeling quite good.  She is anxious about preeclampsia, and fears that her dry mouth means she is dehydrated, but conversely hopes that she doesn't get fluid on her lings because she has heard that can happen with preeclampsia.     O:  Patient Vitals for the past 24 hrs:   BP Temp Temp src Height   21 1530 (!) 147/74 -- -- --   21 1525 (!) 150/73 -- -- --   21 1520 (!) 153/73 -- -- --   21 1515 (!) 154/72 -- -- --   21 1510 (!) 142/68 -- -- --   21 1505 133/66 -- -- --   21 1500 133/69 -- -- --   21 1455 138/72 -- -- --   21 1450 (!) 153/93 -- -- --   21 1445 (!) 162/103 -- -- --   21 1440 (!) 157/101 -- -- --   21 1430 (!) 151/97 -- -- --   21 1420 (!) 166/112 -- -- --   21 1410 (!) 167/85 -- -- --   21 1400 (!) 173/107 -- -- --   21 1350 (!) 159/89 -- -- --   21 1340 (!) 157/85 -- -- --   21 1330 (!) 150/82 -- -- --   21 1320 (!) 151/87 -- -- --   21 1310 (!) 145/80 -- -- --   21 1300 (!) 144/78 -- -- --   21 1250 (!) 152/83 -- -- --   21 1240 (!) 153/87 -- -- --   21 1230 (!) 147/89 -- -- --   21 1229 -- -- -- 1.664 m (5' 5.5\")   21 1225 -- 99  F (37.2  C) Temporal --     Gen: Resting comfortably  CV: Nonlabored breathing  Abd: Gravid consistent with gestational age  Ext: trace edema, reflexes 1+    Recent Labs   Lab 21  1307   CR 0.66   AST 14   ALT 22     Recent Labs   Lab 21  1307   WBC 9.9   RBC 3.76*   HGB 10.4*   HCT 31.5*          Urine pr/Cr ratio 0.34    A/P: 35 year old  at 33w0d with history of white coat " hypertension versus underlying mild chronic hypertension who presents with new severe range hypertension consistent with a diagnosis of superimposed preeclampsia vs preeclampsia.     During our conversation she once again had severe range hypertension, I gave one dose of oral nifedipine and blood pressures came down.  I do believe this was more of an anxiety response than a manifestation of severe features of preeclampsia.     Her first dose of betamethasone was given in triage at 1415 and I recommend follow up in clinic tomorrow with her primary physician Dr Glez to consider continued preeclampsia surveillance and whether she is a candidate for oral antihypertensive therapy.     Lory Fitch MD  Obstetrics, Gynecology, and Infertility  01/27/2021

## 2021-01-27 NOTE — PLAN OF CARE
Data: [facility] at 1209.   Reason for maternal/fetal assessment per patient is Hypertension  . Patient is a . Prenatal record reviewed.      OB History    Para Term  AB Living   3 0 0 0 2 0   SAB TAB Ectopic Multiple Live Births   0 0 0 0 0      # Outcome Date GA Lbr Joseph/2nd Weight Sex Delivery Anes PTL Lv   3 Current            2 AB            1 AB               Medical History:   Past Medical History:   Diagnosis Date     Anxiety 2020     Endometriosis      Hyperlipidemia LDL goal <130 2020     Migraine 2020     Superficial thrombosis of leg 2020     Uncomplicated asthma     allergy related, no inhaler   . Gestational Age 33w0d. VSS. Cervix: not examined.  Fetal movement present. Patient denies cramping, backache, vaginal discharge, pelvic pressure, UTI symptoms, GI problems, bloody show, vaginal bleeding, edema, headache, visual disturbances, epigastric or URQ pain, abdominal pain, rupture of membranes. Support persons sister present.  Action: Verbal consent for EFM. Triage assessment completed. EFM applied for NST. Uterine assessment no contractions. Fetal assessment: Presumed adequate fetal oxygenation documented (see flow record). Patient education on fetal movement counts reviewed. Patient instructed to report change in fetal movement, vaginal leaking of fluid or bleeding, abdominal pain, or any concerns related to the pregnancy to her nurse/physician.   Response: Dr. Fitch informed of elevated BP, NST, lab results. Plan per provider is discharge to home after 1 dose of nifedipine and 1st dose of betamethasone, see MD tomorrow, return to [facility] for 2nd betamethasone. Patient verbalized understanding of education and verbalized agreement with plan. Discharged ambulatory at 1537.

## 2021-01-27 NOTE — PROVIDER NOTIFICATION
MD updated on improvement in BP readings post Nifedipine.  OK for pt to be discharged home at 1530 if BP's remain stable.

## 2021-01-27 NOTE — PROVIDER NOTIFICATION
MD updated on pt having 3 elevated BP's since MD was here to see her.  Order placed for nifedipine and to continue to monitor BP's for another hour.

## 2021-01-27 NOTE — PLAN OF CARE
Pt feeling well.  Some pedal edema.  Labs WNL, except urine pro elevated. Labile 's/80's.   MD updated on labs and BP's.  MD ordered betamethasone and plans to come and see her.

## 2021-01-27 NOTE — DISCHARGE INSTRUCTIONS
Discharge Instruction for Undelivered Patients      You were seen for: high blood pressure pre eclampsia workup  We Consulted: Dr. Fitch  You had (Test or Medicine): non stress test monitoring, 1st dose betamethasone, Nifedipine 10 mg by mouth for elevated BP, pre eclampsia labs     Diet:   Drink 8 to 12 glasses of liquids (milk, juice, water) every day.  You may eat meals and snacks.     Activity:  Call your doctor or nurse midwife if your baby is moving less than usual.     Call your provider if you notice:  Pre eclampsia symptoms:       Swelling in your face or increased swelling in your hands or legs.       Headaches that are not relieved by Tylenol (acetaminophen).       Changes in your vision (blurring: seeing spots or stars.)       Nausea (sick to your stomach) and vomiting (throwing up).        Weight gain of 5 pounds or more per week.       Heartburn that doesn't go away.  Signs of bladder infection: pain when you urinate (use the toilet), need to go more often and more urgently.  The bag of malik (rupture of membranes) breaks, or you notice leaking in your underwear.  Bright red blood in your underwear.  Abdominal (lower belly) or stomach pain.  For first baby: Contractions (tightening) less than 5 minutes apart for one hour or more.  *If less than 34 weeks: Contractions (tightenings) more than 6 times in one hour.  Increase or change in vaginal discharge (note the color and amount)      Follow-up:  As scheduled in the clinic tomorrow.  Return to [facility] at 2 pm for 2nd dose betamethasone at 2:15 pm.

## 2021-01-28 ENCOUNTER — HOSPITAL ENCOUNTER (OUTPATIENT)
Facility: CLINIC | Age: 36
Discharge: HOME | End: 2021-01-28
Attending: OBSTETRICS & GYNECOLOGY | Admitting: OBSTETRICS & GYNECOLOGY
Payer: COMMERCIAL

## 2021-01-28 ENCOUNTER — HOSPITAL ENCOUNTER (OUTPATIENT)
Facility: CLINIC | Age: 36
End: 2021-01-28
Admitting: OBSTETRICS & GYNECOLOGY
Payer: COMMERCIAL

## 2021-01-28 PROCEDURE — 96372 THER/PROPH/DIAG INJ SC/IM: CPT

## 2021-01-28 PROCEDURE — 250N000011 HC RX IP 250 OP 636

## 2021-01-28 RX ORDER — BETAMETHASONE SODIUM PHOSPHATE AND BETAMETHASONE ACETATE 3; 3 MG/ML; MG/ML
12 INJECTION, SUSPENSION INTRA-ARTICULAR; INTRALESIONAL; INTRAMUSCULAR; SOFT TISSUE ONCE
Status: COMPLETED | OUTPATIENT
Start: 2021-01-28 | End: 2021-01-28

## 2021-01-28 RX ORDER — BETAMETHASONE SODIUM PHOSPHATE AND BETAMETHASONE ACETATE 3; 3 MG/ML; MG/ML
INJECTION, SUSPENSION INTRA-ARTICULAR; INTRALESIONAL; INTRAMUSCULAR; SOFT TISSUE
Status: COMPLETED
Start: 2021-01-28 | End: 2021-01-28

## 2021-01-28 RX ADMIN — BETAMETHASONE ACETATE AND BETAMETHASONE SODIUM PHOSPHATE 12 MG: 3; 3 INJECTION, SUSPENSION INTRA-ARTICULAR; INTRALESIONAL; INTRAMUSCULAR; SOFT TISSUE at 14:26

## 2021-01-28 RX ADMIN — BETAMETHASONE SODIUM PHOSPHATE AND BETAMETHASONE ACETATE 12 MG: 3; 3 INJECTION, SUSPENSION INTRA-ARTICULAR; INTRALESIONAL; INTRAMUSCULAR; SOFT TISSUE at 14:26

## 2021-01-28 NOTE — PLAN OF CARE
Pt 33+1 presents to [facility] for 2nd betamethasone inj.  Pt was seen in clinic today and will be starting BP medication tonight.  Betamethasone given, discharge instructions rev'd with pt.  Pt verbalized understanding and was discharge to home undelivered.

## 2021-01-28 NOTE — DISCHARGE INSTRUCTIONS
Discharge Instruction for Undelivered Patients      You were seen for: Betamethasone  We Consulted: Dr. Fitch  You had (Test or Medicine):Betamethasone    Diet:   Drink 8 to 12 glasses of liquids (milk, juice, water) every day.  You may eat meals and snacks.     Activity:  Call your doctor or nurse midwife if your baby is moving less than usual.     Call your provider if you notice:  Swelling in your face or increased swelling in your hands or legs.  Headaches that are not relieved by Tylenol (acetaminophen).  Changes in your vision (blurring: seeing spots or stars.)  Nausea (sick to your stomach) and vomiting (throwing up).   Weight gain of 5 pounds or more per week.  Heartburn that doesn't go away.  Signs of bladder infection: pain when you urinate (use the toilet), need to go more often and more urgently.  The bag of malik (rupture of membranes) breaks, or you notice leaking in your underwear.  Bright red blood in your underwear.  Abdominal (lower belly) or stomach pain.  For first baby: Contractions (tightening) less than 5 minutes apart for one hour or more.  Second (plus) baby: Contractions (tightening) less than 10 minutes apart and getting stronger.  *If less than 34 weeks: Contractions (tightenings) more than 6 times in one hour.  Increase or change in vaginal discharge (note the color and amount)    Follow-up:  As scheduled in the clinic

## 2021-05-01 ENCOUNTER — HEALTH MAINTENANCE LETTER (OUTPATIENT)
Age: 36
End: 2021-05-01

## 2021-05-26 ENCOUNTER — RECORDS - HEALTHEAST (OUTPATIENT)
Dept: ADMINISTRATIVE | Facility: CLINIC | Age: 36
End: 2021-05-26

## 2021-05-28 ENCOUNTER — RECORDS - HEALTHEAST (OUTPATIENT)
Dept: ADMINISTRATIVE | Facility: CLINIC | Age: 36
End: 2021-05-28

## 2021-05-30 ENCOUNTER — RECORDS - HEALTHEAST (OUTPATIENT)
Dept: ADMINISTRATIVE | Facility: CLINIC | Age: 36
End: 2021-05-30

## 2021-06-01 ENCOUNTER — RECORDS - HEALTHEAST (OUTPATIENT)
Dept: ADMINISTRATIVE | Facility: CLINIC | Age: 36
End: 2021-06-01

## 2021-06-02 ENCOUNTER — RECORDS - HEALTHEAST (OUTPATIENT)
Dept: ADMINISTRATIVE | Facility: CLINIC | Age: 36
End: 2021-06-02

## 2021-06-02 VITALS — HEIGHT: 66 IN | BODY MASS INDEX: 32.14 KG/M2 | WEIGHT: 200 LBS

## 2021-06-21 NOTE — ANESTHESIA CARE TRANSFER NOTE
Last vitals:   Vitals:    11/13/18 0741   BP: 155/87   Pulse: 91   Resp: 18   Temp: 36.8  C (98.3  F)   SpO2: 99%     Patient's level of consciousness is drowsy  Spontaneous respirations: yes  Maintains airway independently: yes  Dentition unchanged: yes  Oropharynx: oropharynx clear of all foreign objects    QCDR Measures:  ASA# 20 - Surgical Safety Checklist: WHO surgical safety checklist completed prior to induction    PQRS# 430 - Adult PONV Prevention: 4558F - Pt received => 2 anti-emetic agents (different classes) preop & intraop  ASA# 8 - Peds PONV Prevention: NA - Not pediatric patient, not GA or 2 or more risk factors NOT present  PQRS# 424 - Arabella-op Temp Management: 4559F - At least one body temp DOCUMENTED => 35.5C or 95.9F within required timeframe  PQRS# 426 - PACU Transfer Protocol: - Transfer of care checklist used  ASA# 14 - Acute Post-op Pain: ASA14B - Patient did NOT experience pain >= 7 out of 10

## 2021-06-21 NOTE — ANESTHESIA POSTPROCEDURE EVALUATION
Patient: Saundra Cronin  SUCTION DILATION AND CURETTAGE  Anesthesia type: MAC    Patient location: Phase II Recovery  Last vitals:   Vitals:    11/13/18 0741   BP: 155/87   Pulse: 91   Resp: 18   Temp: 36.8  C (98.3  F)   SpO2: 99%     Post vital signs: stable  Level of consciousness: awake and responds to simple questions  Post-anesthesia pain: pain controlled  Post-anesthesia nausea and vomiting: no  Pulmonary: unassisted, return to baseline  Cardiovascular: stable and blood pressure at baseline  Hydration: adequate  Anesthetic events: no    QCDR Measures:  ASA# 11 - Arabella-op Cardiac Arrest: ASA11B - Patient did NOT experience unanticipated cardiac arrest  ASA# 12 - Arabella-op Mortality Rate: ASA12B - Patient did NOT die  ASA# 13 - PACU Re-Intubation Rate: ASA13B - Patient did NOT require a new airway mgmt  ASA# 10 - Composite Anes Safety: ASA10A - No serious adverse event    Additional Notes:

## 2021-07-03 NOTE — ANESTHESIA PREPROCEDURE EVALUATION
Anesthesia Preprocedure Evaluation by Pranav Arriola MD at 11/13/2018  6:44 AM     Author: Pranav Arriola MD Service: -- Author Type: Physician    Filed: 11/13/2018  6:46 AM Date of Service: 11/13/2018  6:44 AM Status: Signed    : Pranav Arriola MD (Physician)       Anesthesia Evaluation      Patient summary reviewed   No history of anesthetic complications     Airway   Mallampati: II  Neck ROM: full   Pulmonary - normal exam    breath sounds clear to auscultation  (+) asthma (exercise-induced)  mild,  well controlled,   (-) not a smoker                         Cardiovascular - normal exam  Exercise tolerance: > or = 4 METS   Neuro/Psych    (+) anxiety/panic attacks,     Endo/Other    (+) obesity (bmi 32),      GI/Hepatic/Renal    (+) GERD well controlled and intermittent,        Other findings: H/o DVT after foot fracture      Dental    (+) caps                           Anesthesia Plan  Planned anesthetic: MAC  Versed/fent/propofol ggt  Ketamine PRN  Decadron/zofran  ASA 2   Induction: intravenous   Anesthetic plan and risks discussed with: patient, spouse and parent/guardian  Anesthesia plan special considerations: antiemetics,   Post-op plan: routine recovery      Hgb 13.9

## 2021-10-11 ENCOUNTER — HEALTH MAINTENANCE LETTER (OUTPATIENT)
Age: 36
End: 2021-10-11

## 2022-05-22 ENCOUNTER — HEALTH MAINTENANCE LETTER (OUTPATIENT)
Age: 37
End: 2022-05-22

## 2022-08-31 ENCOUNTER — OFFICE VISIT (OUTPATIENT)
Dept: CARDIOLOGY | Facility: CLINIC | Age: 37
End: 2022-08-31
Payer: COMMERCIAL

## 2022-08-31 VITALS
BODY MASS INDEX: 35.23 KG/M2 | WEIGHT: 215 LBS | OXYGEN SATURATION: 98 % | SYSTOLIC BLOOD PRESSURE: 138 MMHG | DIASTOLIC BLOOD PRESSURE: 86 MMHG | HEART RATE: 84 BPM | TEMPERATURE: 98.2 F

## 2022-08-31 DIAGNOSIS — E66.01 MORBID OBESITY (H): ICD-10-CM

## 2022-08-31 DIAGNOSIS — Z87.59 H/O PRE-ECLAMPSIA: Primary | ICD-10-CM

## 2022-08-31 PROCEDURE — 99213 OFFICE O/P EST LOW 20 MIN: CPT | Performed by: INTERNAL MEDICINE

## 2022-08-31 RX ORDER — UBIDECARENONE 100 MG
100 CAPSULE ORAL DAILY
COMMUNITY

## 2022-08-31 NOTE — LETTER
8/31/2022    Stacie Glez MD  [facility] [address]  [facility] [address]    RE: Saundra Cronin       Dear Colleague,     I had the pleasure of seeing Saundra Cronin in the [facility].  HPI and Plan:   Today I had the pleasure of seeing Saundra Cronin at [facility]. She is a pleasant 37 year old patient with a past medical history of migraines presents to the clinic for a follow-up visit.  The patient 2020 when she was pregnant with her first child.  She had severe anxiety and shortness of breath associated with that.  Symptoms improved after starting Zoloft.  Cardiac work-up was unrevealing.    Today, the patient presents to the clinic for a follow-up visit.  She is her daughter is now 18 months old and she is planning to start trying for another baby.  Her OB suggested regular follow-up with cardiology for close monitoring.  She did have preeclampsia and required magnesium.  I reviewed her echocardiogram, EKG, blood work.    Assessment and plan  1.  Severe anxiety-improvement in symptoms after starting Zoloft  2.  Preeclampsia    I provided reassurance and told the patient that her echocardiogram is completely normal and shows no evidence of structural or valvular heart disease.  I told her that we can monitor closely especially in the second and third trimester and if need be start her antihypertensives.  I do not believe it is necessary to perform a transthoracic echocardiogram as her first 1 was essentially normal.  I have her come back and see me in second or third trimester pregnancy for close monitoring.    Thank you for allowing me to participate in the care of Saundra Cronin    This note was completed in part using Dragon voice recognition software. Although reviewed after completion, some word and grammatical errors may occur.    Ivan Martinez MD  Cardiology    No orders of the defined types were placed in this encounter.      Orders  Placed This Encounter   Medications     co-enzyme Q-10 100 MG CAPS capsule     Sig: Take 100 mg by mouth daily       There are no discontinued medications.    No diagnosis found.    CURRENT MEDICATIONS:  Current Outpatient Medications   Medication Sig Dispense Refill     aspirin 81 MG EC tablet Take 81 mg by mouth daily       cetirizine (ZYRTEC) 10 MG tablet Take 10 mg by mouth daily       co-enzyme Q-10 100 MG CAPS capsule Take 100 mg by mouth daily       cyanocobalamin (VITAMIN B-12) 1000 MCG tablet Take 1,000 mcg by mouth daily       fluticasone (FLONASE) 50 MCG/ACT nasal spray Spray 1 spray into both nostrils daily       LANsoprazole (PREVACID) 30 MG DR capsule Take 30 mg by mouth daily       Prenatal Vit-Fe Fumarate-FA (PRENATAL VITAMINS PO)        sertraline (ZOLOFT) 25 MG tablet Take 25 mg by mouth daily       vitamin D2 (ERGOCALCIFEROL) 70765 units (1250 mcg) capsule Take 4,000 Units by mouth daily         ALLERGIES     Allergies   Allergen Reactions     Amoxicillin Hives     As a child     Augmentin Hives     As a child     Ceclor [Cefaclor] Hives     As a child       PAST MEDICAL HISTORY:  Past Medical History:   Diagnosis Date     Anxiety 12/11/2020     Endometriosis      Hyperlipidemia LDL goal <130 12/11/2020     Migraine 12/11/2020     Superficial thrombosis of leg 12/11/2020     Uncomplicated asthma     allergy related, no inhaler       PAST SURGICAL HISTORY:  Past Surgical History:   Procedure Laterality Date     DILATION AND CURETTAGE       DILATION AND CURETTAGE N/A 11/13/2018    Procedure: SUCTION DILATION AND CURETTAGE;  Surgeon: Hanna Kimbrough DO;  Location: [facility];  Service: Gynecology     LAPAROSCOPY      x2 for endometriosis     LAPAROSCOPY DIAGNOSTIC (GENERAL)       TONSILLECTOMY       TONSILLECTOMY         FAMILY HISTORY:  No family history on file.    SOCIAL HISTORY:  Social History     Socioeconomic History     Marital status:    Tobacco Use     Smoking status:  Never Smoker     Smokeless tobacco: Never Used   Substance and Sexual Activity     Alcohol use: Not Currently     Drug use: Never       Review of Systems:  Skin:          Eyes:         ENT:         Respiratory:  Negative for shortness of breath;dyspnea on exertion     Cardiovascular:  Negative for;palpitations;chest pain;edema;syncope or near-syncope;dizziness;lightheadedness;fatigue      Gastroenterology:        Genitourinary:         Musculoskeletal:         Neurologic:         Psychiatric:         Heme/Lymph/Imm:         Endocrine:           Physical Exam:  Vitals: /86   Pulse 84   Temp 98.2  F (36.8  C) (Tympanic)   Wt 97.5 kg (215 lb)   SpO2 98%   BMI 35.23 kg/m    Constitutional: awake, alert, no distress  Head: Normocephalic, atraumatic  Eyes: Conjunctivae and lids unremarkable, sclera white  ENT: No pallor or cyanosis  Respiratory: Good chest movement, no distress  Cardiac: Regular rate and rhythm, S1-S2 normal. No murmurs gallops or rubs. No pedal edema.   Extremities and musculoskeletal: No gross motor deficit  Neurological.  Affect normal  Psych: Alert and oriented x3    Recent Lab Results:  LIPID RESULTS:  No results found for: CHOL, HDL, LDL, TRIG, CHOLHDLRATIO    LIVER ENZYME RESULTS:  Lab Results   Component Value Date    AST 14 01/27/2021    ALT 22 01/27/2021       CBC RESULTS:  Lab Results   Component Value Date    WBC 9.9 01/27/2021    RBC 3.76 (L) 01/27/2021    HGB 10.4 (L) 01/27/2021    HCT 31.5 (L) 01/27/2021    MCV 84 01/27/2021    MCH 27.7 01/27/2021    MCHC 33.0 01/27/2021    RDW 12.9 01/27/2021     01/27/2021       BMP RESULTS:  Lab Results   Component Value Date    CR 0.66 01/27/2021    GFRESTIMATED >90 01/27/2021    GFRESTBLACK >90 01/27/2021        A1C RESULTS:  No results found for: A1C    INR RESULTS:  No results found for: INR    CC  No referring provider defined for this encounter.    All medical records were reviewed in detail and discussed with the patient. Greater  than 30 mins were spent with the patient, 50% of this time was spent on counseling and coordination of care.  After visit summary was printed and given to the patient.    Thank you for allowing me to participate in the care of your patient.      Sincerely,     Ivan Martinez MD     [facility]  cc:   Ivan Martinez MD  [address] [facility] [address] [place] [id number]

## 2022-08-31 NOTE — PROGRESS NOTES
HPI and Plan:   Today I had the pleasure of seeing Saundra Cronin at [facility] clinic. She is a pleasant 37 year old patient with a past medical history of migraines presents to the clinic for a follow-up visit.  The patient 2020 when she was pregnant with her first child.  She had severe anxiety and shortness of breath associated with that.  Symptoms improved after starting Zoloft.  Cardiac work-up was unrevealing.    Today, the patient presents to the clinic for a follow-up visit.  She is her daughter is now 18 months old and she is planning to start trying for another baby.  Her OB suggested regular follow-up with cardiology for close monitoring.  She did have preeclampsia and required magnesium.  I reviewed her echocardiogram, EKG, blood work.    Assessment and plan  1.  Severe anxiety-improvement in symptoms after starting Zoloft  2.  Preeclampsia    I provided reassurance and told the patient that her echocardiogram is completely normal and shows no evidence of structural or valvular heart disease.  I told her that we can monitor closely especially in the second and third trimester and if need be start her antihypertensives.  I do not believe it is necessary to perform a transthoracic echocardiogram as her first 1 was essentially normal.  I have her come back and see me in second or third trimester pregnancy for close monitoring.    Thank you for allowing me to participate in the care of Saundra Cronin    This note was completed in part using Dragon voice recognition software. Although reviewed after completion, some word and grammatical errors may occur.    Ivan Martinez MD  Cardiology    No orders of the defined types were placed in this encounter.      Orders Placed This Encounter   Medications     co-enzyme Q-10 100 MG CAPS capsule     Sig: Take 100 mg by mouth daily       There are no discontinued medications.    No diagnosis found.    CURRENT MEDICATIONS:  Current Outpatient  Medications   Medication Sig Dispense Refill     aspirin 81 MG EC tablet Take 81 mg by mouth daily       cetirizine (ZYRTEC) 10 MG tablet Take 10 mg by mouth daily       co-enzyme Q-10 100 MG CAPS capsule Take 100 mg by mouth daily       cyanocobalamin (VITAMIN B-12) 1000 MCG tablet Take 1,000 mcg by mouth daily       fluticasone (FLONASE) 50 MCG/ACT nasal spray Spray 1 spray into both nostrils daily       LANsoprazole (PREVACID) 30 MG DR capsule Take 30 mg by mouth daily       Prenatal Vit-Fe Fumarate-FA (PRENATAL VITAMINS PO)        sertraline (ZOLOFT) 25 MG tablet Take 25 mg by mouth daily       vitamin D2 (ERGOCALCIFEROL) 32710 units (1250 mcg) capsule Take 4,000 Units by mouth daily         ALLERGIES     Allergies   Allergen Reactions     Amoxicillin Hives     As a child     Augmentin Hives     As a child     Ceclor [Cefaclor] Hives     As a child       PAST MEDICAL HISTORY:  Past Medical History:   Diagnosis Date     Anxiety 12/11/2020     Endometriosis      Hyperlipidemia LDL goal <130 12/11/2020     Migraine 12/11/2020     Superficial thrombosis of leg 12/11/2020     Uncomplicated asthma     allergy related, no inhaler       PAST SURGICAL HISTORY:  Past Surgical History:   Procedure Laterality Date     DILATION AND CURETTAGE       DILATION AND CURETTAGE N/A 11/13/2018    Procedure: SUCTION DILATION AND CURETTAGE;  Surgeon: Hanna Kimbrough DO;  Location: [facility];  Service: Gynecology     LAPAROSCOPY      x2 for endometriosis     LAPAROSCOPY DIAGNOSTIC (GENERAL)       TONSILLECTOMY       TONSILLECTOMY         FAMILY HISTORY:  No family history on file.    SOCIAL HISTORY:  Social History     Socioeconomic History     Marital status:    Tobacco Use     Smoking status: Never Smoker     Smokeless tobacco: Never Used   Substance and Sexual Activity     Alcohol use: Not Currently     Drug use: Never       Review of Systems:  Skin:          Eyes:         ENT:         Respiratory:  Negative for  shortness of breath;dyspnea on exertion     Cardiovascular:  Negative for;palpitations;chest pain;edema;syncope or near-syncope;dizziness;lightheadedness;fatigue      Gastroenterology:        Genitourinary:         Musculoskeletal:         Neurologic:         Psychiatric:         Heme/Lymph/Imm:         Endocrine:           Physical Exam:  Vitals: /86   Pulse 84   Temp 98.2  F (36.8  C) (Tympanic)   Wt 97.5 kg (215 lb)   SpO2 98%   BMI 35.23 kg/m    Constitutional: awake, alert, no distress  Head: Normocephalic, atraumatic  Eyes: Conjunctivae and lids unremarkable, sclera white  ENT: No pallor or cyanosis  Respiratory: Good chest movement, no distress  Cardiac: Regular rate and rhythm, S1-S2 normal. No murmurs gallops or rubs. No pedal edema.   Extremities and musculoskeletal: No gross motor deficit  Neurological.  Affect normal  Psych: Alert and oriented x3    Recent Lab Results:  LIPID RESULTS:  No results found for: CHOL, HDL, LDL, TRIG, CHOLHDLRATIO    LIVER ENZYME RESULTS:  Lab Results   Component Value Date    AST 14 01/27/2021    ALT 22 01/27/2021       CBC RESULTS:  Lab Results   Component Value Date    WBC 9.9 01/27/2021    RBC 3.76 (L) 01/27/2021    HGB 10.4 (L) 01/27/2021    HCT 31.5 (L) 01/27/2021    MCV 84 01/27/2021    MCH 27.7 01/27/2021    MCHC 33.0 01/27/2021    RDW 12.9 01/27/2021     01/27/2021       BMP RESULTS:  Lab Results   Component Value Date    CR 0.66 01/27/2021    GFRESTIMATED >90 01/27/2021    GFRESTBLACK >90 01/27/2021        A1C RESULTS:  No results found for: A1C    INR RESULTS:  No results found for: INR    CC  No referring provider defined for this encounter.    All medical records were reviewed in detail and discussed with the patient. Greater than 30 mins were spent with the patient, 50% of this time was spent on counseling and coordination of care.  After visit summary was printed and given to the patient.

## 2022-09-24 ENCOUNTER — HEALTH MAINTENANCE LETTER (OUTPATIENT)
Age: 37
End: 2022-09-24

## 2022-12-06 ENCOUNTER — TRANSFERRED RECORDS (OUTPATIENT)
Dept: HEALTH INFORMATION MANAGEMENT | Facility: CLINIC | Age: 37
End: 2022-12-06

## 2022-12-19 ENCOUNTER — MEDICAL CORRESPONDENCE (OUTPATIENT)
Dept: HEALTH INFORMATION MANAGEMENT | Facility: CLINIC | Age: 37
End: 2022-12-19

## 2022-12-22 ENCOUNTER — TELEPHONE (OUTPATIENT)
Dept: PEDIATRIC CARDIOLOGY | Facility: CLINIC | Age: 37
End: 2022-12-22

## 2022-12-22 NOTE — TELEPHONE ENCOUNTER
Returned call to confirm an schedule appointment for 1/26/23 at 12:00PM. Patient and I discussed appointment details, location and arrival time.      Bhakti Candelaria  [facility]    [phone]

## 2023-01-24 ENCOUNTER — MEDICAL CORRESPONDENCE (OUTPATIENT)
Dept: HEALTH INFORMATION MANAGEMENT | Facility: CLINIC | Age: 38
End: 2023-01-24
Payer: COMMERCIAL

## 2023-01-25 ENCOUNTER — TRANSCRIBE ORDERS (OUTPATIENT)
Dept: MATERNAL FETAL MEDICINE | Facility: CLINIC | Age: 38
End: 2023-01-25
Payer: COMMERCIAL

## 2023-01-25 ENCOUNTER — TRANSFERRED RECORDS (OUTPATIENT)
Dept: HEALTH INFORMATION MANAGEMENT | Facility: CLINIC | Age: 38
End: 2023-01-25
Payer: COMMERCIAL

## 2023-01-25 DIAGNOSIS — O35.CXX0: ICD-10-CM

## 2023-01-25 DIAGNOSIS — O35.CXX0 PREGNANCY COMPLICATED BY FETAL LUNG LESION, SINGLE OR UNSPECIFIED FETUS: Primary | ICD-10-CM

## 2023-01-25 DIAGNOSIS — O26.90 PREGNANCY RELATED CONDITION, ANTEPARTUM: Primary | ICD-10-CM

## 2023-01-26 ENCOUNTER — OFFICE VISIT (OUTPATIENT)
Dept: CARDIOLOGY | Facility: CLINIC | Age: 38
End: 2023-01-26
Payer: COMMERCIAL

## 2023-01-26 ENCOUNTER — HOSPITAL ENCOUNTER (OUTPATIENT)
Dept: CARDIOLOGY | Facility: CLINIC | Age: 38
Discharge: HOME | End: 2023-01-26
Admitting: OBSTETRICS & GYNECOLOGY
Payer: COMMERCIAL

## 2023-01-26 DIAGNOSIS — O26.92 PREGNANCY RELATED CONDITION IN SECOND TRIMESTER: Primary | ICD-10-CM

## 2023-01-26 DIAGNOSIS — O09.529 AMA (ADVANCED MATERNAL AGE) MULTIGRAVIDA 35+: ICD-10-CM

## 2023-01-26 PROCEDURE — 93325 DOPPLER ECHO COLOR FLOW MAPG: CPT

## 2023-01-26 PROCEDURE — 76827 ECHO EXAM OF FETAL HEART: CPT | Mod: 26 | Performed by: PEDIATRICS

## 2023-01-26 PROCEDURE — 99204 OFFICE O/P NEW MOD 45 MIN: CPT | Mod: 25 | Performed by: PEDIATRICS

## 2023-01-26 PROCEDURE — 76825 ECHO EXAM OF FETAL HEART: CPT | Mod: 26 | Performed by: PEDIATRICS

## 2023-01-26 PROCEDURE — 93325 DOPPLER ECHO COLOR FLOW MAPG: CPT | Mod: 26 | Performed by: PEDIATRICS

## 2023-01-26 NOTE — PROGRESS NOTES
Fetal Cardiology Consultation    Patient:  Saundra Cronin MRN:  [id number]   YOB: 1985 Age:  37 year old   Date of Visit:  1/26/2023 PCP:  Stacie Glez MD   JEVON: 5/16/2023, by Last Menstrual Period EGA: 24w2d weeks     Dear Dr. Cisse:    I had the pleasure of seeing Saundra Cronin at the [facility] in [place] on 1/26/2023 in consultation for fetal echocardiography results. She presented today accompanied by her partner. As you know, she is a 37 year old female with current pregnancy affected by fetal left CPAM.    The fetal echocardiogram was normal. Normal fetal cardiac anatomy. Normal right and left ventricular size and function without hypertrophy. No evidence of diastolic dysfunction. No pericardial effusion. No arrhythmia.     I reviewed and interpreted the fetal echocardiogram today. I discussed the normal results with Ms. Cronin and her partner. While these results are normal, it is important to note that fetal echocardiography cannot exclude small atrial or ventricular septal defects, persistent ductus arteriosus, mild coarctation of the aorta, partial anomalous pulmonary venous return, minor anatomic valve anomalies, or coronary artery anomalies.     Thank you for allowing me to participate in Ms. Cronin's care. Please don't hesitate to contact me or the Fetal Cardiology team at [facility] with any questions or concerns.     I spent a total of 40 minutes on the date of the encounter doing chart review, patient history, documentation, counseling, and coordinating care.    Arpit Dyer MD  Pediatric Cardiology  [facility]  Phone [phone]    Review of the result(s) of each unique test - fetal echocardiogram

## 2023-02-02 ENCOUNTER — PRE VISIT (OUTPATIENT)
Dept: MATERNAL FETAL MEDICINE | Facility: HOSPITAL | Age: 38
End: 2023-02-02
Payer: COMMERCIAL

## 2023-02-08 ENCOUNTER — ANCILLARY PROCEDURE (OUTPATIENT)
Dept: ULTRASOUND IMAGING | Facility: HOSPITAL | Age: 38
End: 2023-02-08
Attending: OBSTETRICS & GYNECOLOGY
Payer: COMMERCIAL

## 2023-02-08 ENCOUNTER — OFFICE VISIT (OUTPATIENT)
Dept: MATERNAL FETAL MEDICINE | Facility: HOSPITAL | Age: 38
End: 2023-02-08
Attending: OBSTETRICS & GYNECOLOGY
Payer: COMMERCIAL

## 2023-02-08 DIAGNOSIS — O35.CXX0 PREGNANCY COMPLICATED BY FETAL LUNG LESION, SINGLE OR UNSPECIFIED FETUS: ICD-10-CM

## 2023-02-08 DIAGNOSIS — O35.CXX0 PREGNANCY COMPLICATED BY FETAL LUNG LESION, SINGLE OR UNSPECIFIED FETUS: Primary | ICD-10-CM

## 2023-02-08 PROCEDURE — 99207 PR NO CHARGE LOS: CPT | Performed by: OBSTETRICS & GYNECOLOGY

## 2023-02-08 PROCEDURE — 76816 OB US FOLLOW-UP PER FETUS: CPT | Mod: 26 | Performed by: OBSTETRICS & GYNECOLOGY

## 2023-02-08 PROCEDURE — 76816 OB US FOLLOW-UP PER FETUS: CPT

## 2023-02-08 NOTE — NURSING NOTE
Saundra Cronin is a  at 26w1d who presents to [facility] for RL2. Pt reports positive fetal movement. Pt denies bldg/lof/change in discharge, contractions, headache, vision changes, chest pain/SOB or edema. SBAR given to Dr. Cisse, see note in Epic.   Jenny Gamino RN

## 2023-02-08 NOTE — PROGRESS NOTES
Please see the imaging tab for details of the ultrasound performed today.    Tasha Cisse MD  Specialist in Maternal-Fetal Medicine

## 2023-02-23 ENCOUNTER — OFFICE VISIT (OUTPATIENT)
Dept: MATERNAL FETAL MEDICINE | Facility: HOSPITAL | Age: 38
End: 2023-02-23
Attending: OBSTETRICS & GYNECOLOGY
Payer: COMMERCIAL

## 2023-02-23 ENCOUNTER — ANCILLARY PROCEDURE (OUTPATIENT)
Dept: ULTRASOUND IMAGING | Facility: HOSPITAL | Age: 38
End: 2023-02-23
Attending: OBSTETRICS & GYNECOLOGY
Payer: COMMERCIAL

## 2023-02-23 DIAGNOSIS — O35.CXX0 PREGNANCY COMPLICATED BY FETAL LUNG LESION, SINGLE OR UNSPECIFIED FETUS: ICD-10-CM

## 2023-02-23 DIAGNOSIS — O35.CXX0 PREGNANCY COMPLICATED BY FETAL LUNG LESION, SINGLE OR UNSPECIFIED FETUS: Primary | ICD-10-CM

## 2023-02-23 PROCEDURE — 99213 OFFICE O/P EST LOW 20 MIN: CPT | Mod: 25 | Performed by: OBSTETRICS & GYNECOLOGY

## 2023-02-23 PROCEDURE — 76816 OB US FOLLOW-UP PER FETUS: CPT

## 2023-02-23 PROCEDURE — 76816 OB US FOLLOW-UP PER FETUS: CPT | Mod: 26 | Performed by: OBSTETRICS & GYNECOLOGY

## 2023-02-23 NOTE — PROGRESS NOTES
[facility] Clinic Visit    Saundra presents to [facility] clinic for ultrasound and recommendations. The following problems were addressed:    Fetal CPAM    Tests Reviewed: prior ultrasounds  Tests Ordered: follow up ultrasound  Unique Records reviewed: na    Impression:  1) Sorto intrauterine pregnancy at 28w 2d gestational age.   2) Again noted is a left sided CPAM with CVR stable at 0.08. Otherwise, none of the anomalies commonly detected by ultrasound were evident in the limited fetal anatomic survey as described above, anatomy limited by gestational age and fetal lie.   3) Growth parameters and estimated fetal weight were consistent with established dates.  4) The amniotic fluid volume appeared normal.  5) Normal fetal activity for gestational age.    Plan:  Thank-you for referring your patient to assess fetal growth.     I discussed the findings on today's ultrasound with the patient. Saundra will return in 2 weeks to reassess CPAM measurements, and evaluate for fetal hydrops. Fetal growth will be reassessed in 4 weeks.    Return to primary provider for continued prenatal care.    If you have questions regarding today's evaluation or if we can be of further service, please contact the Maternal-Fetal Medicine Center.    **Fetal anomalies may be present but not detected**    Jazzy Whyte MD  Maternal Fetal Medicine    Total Time: 10 minutes spent on the date of the encounter doing chart review, history and exam, documentation and further activities as noted above.

## 2023-03-08 ENCOUNTER — ANCILLARY PROCEDURE (OUTPATIENT)
Dept: ULTRASOUND IMAGING | Facility: HOSPITAL | Age: 38
End: 2023-03-08
Attending: OBSTETRICS & GYNECOLOGY
Payer: COMMERCIAL

## 2023-03-08 ENCOUNTER — TRANSFERRED RECORDS (OUTPATIENT)
Dept: HEALTH INFORMATION MANAGEMENT | Facility: CLINIC | Age: 38
End: 2023-03-08

## 2023-03-08 ENCOUNTER — OFFICE VISIT (OUTPATIENT)
Dept: MATERNAL FETAL MEDICINE | Facility: HOSPITAL | Age: 38
End: 2023-03-08
Attending: OBSTETRICS & GYNECOLOGY
Payer: COMMERCIAL

## 2023-03-08 DIAGNOSIS — O35.CXX0 PREGNANCY COMPLICATED BY FETAL LUNG LESION, SINGLE OR UNSPECIFIED FETUS: Primary | ICD-10-CM

## 2023-03-08 DIAGNOSIS — O35.CXX0 PREGNANCY COMPLICATED BY FETAL LUNG LESION, SINGLE OR UNSPECIFIED FETUS: ICD-10-CM

## 2023-03-08 PROCEDURE — 76816 OB US FOLLOW-UP PER FETUS: CPT

## 2023-03-08 PROCEDURE — 76816 OB US FOLLOW-UP PER FETUS: CPT | Mod: 26 | Performed by: OBSTETRICS & GYNECOLOGY

## 2023-03-08 PROCEDURE — 99207 PR NO CHARGE LOS: CPT | Performed by: OBSTETRICS & GYNECOLOGY

## 2023-03-08 NOTE — NURSING NOTE
Saundra Cronin is a  at 30w1d who presents to [facility] for hydrops check related to CPAM. Pt reports positive fetal movement. Pt denies bldg/lof/change in discharge, contractions, headache, vision changes, chest pain/SOB or edema. SBAR given to Dr. Cardoza, see note in Epic.

## 2023-03-22 ENCOUNTER — OFFICE VISIT (OUTPATIENT)
Dept: MATERNAL FETAL MEDICINE | Facility: HOSPITAL | Age: 38
End: 2023-03-22
Attending: OBSTETRICS & GYNECOLOGY
Payer: COMMERCIAL

## 2023-03-22 ENCOUNTER — TELEPHONE (OUTPATIENT)
Dept: SURGERY | Facility: CLINIC | Age: 38
End: 2023-03-22

## 2023-03-22 ENCOUNTER — ANCILLARY PROCEDURE (OUTPATIENT)
Dept: ULTRASOUND IMAGING | Facility: HOSPITAL | Age: 38
End: 2023-03-22
Attending: OBSTETRICS & GYNECOLOGY
Payer: COMMERCIAL

## 2023-03-22 DIAGNOSIS — O35.CXX0 PREGNANCY COMPLICATED BY FETAL LUNG LESION, SINGLE OR UNSPECIFIED FETUS: ICD-10-CM

## 2023-03-22 DIAGNOSIS — O35.CXX0 PREGNANCY COMPLICATED BY FETAL LUNG LESION, SINGLE OR UNSPECIFIED FETUS: Primary | ICD-10-CM

## 2023-03-22 PROCEDURE — 76816 OB US FOLLOW-UP PER FETUS: CPT | Mod: 26 | Performed by: OBSTETRICS & GYNECOLOGY

## 2023-03-22 PROCEDURE — 99213 OFFICE O/P EST LOW 20 MIN: CPT | Mod: 25 | Performed by: OBSTETRICS & GYNECOLOGY

## 2023-03-22 PROCEDURE — 76816 OB US FOLLOW-UP PER FETUS: CPT

## 2023-03-22 NOTE — NURSING NOTE
Saundra STANLEY Mehrdad is a  at 32w1d who presents to [facility] for follow up growth ultrasound. Pt reports positive fetal movement. Pt denies bldg/lof/change in discharge, contractions, headache, vision changes, chest pain/SOB or edema. SBAR given to Dr. Whyte, see note in Epic.

## 2023-03-22 NOTE — PROGRESS NOTES
[facility] Clinic Visit    Saundra presents to [facility] clinic for ultrasound and recommendations. The following problems were addressed:    Fetal lung lesion    Tests Reviewed: prior US  Tests Ordered: pediatric surgery consultation  Unique Records reviewed: na    Impression:  1) Sorto intrauterine pregnancy at 32w 1d gestational age.   2) Again noted is a right sided CPAM with a CVR of 0.07 (stable from previous). Otherwise, none of the anomalies commonly detected by ultrasound were evident in the limited fetal anatomic survey as described above, anatomy limited by gestational age and fetal lie. No sonographic evidence of fetal hydrops.  3) Growth parameters and estimated fetal weight were consistent with established dates.  4) The amniotic fluid volume appeared normal.  5) Normal fetal activity for gestational age.    Plan:  Thank-you for referring your patient to assess fetal growth.     I discussed the findings on today's ultrasound with the patient.  We will continue to monitor CPAM and hydrops check every 2 weeks. Fetal growth will be reassessed in 4 weeks. A consultation with pediatric surgery is ordered to discuss post  care plan.    Return to primary provider for continued prenatal care.    If you have questions regarding today's evaluation or if we can be of further service, please contact the Maternal-Fetal Medicine Center.    **Fetal anomalies may be present but not detected**    Jazzy Whyte MD  Maternal Fetal Medicine    Total Time: 12 minutes spent on the date of the encounter doing chart review, history and exam, documentation and further activities as noted above.

## 2023-04-05 ENCOUNTER — ANCILLARY PROCEDURE (OUTPATIENT)
Dept: ULTRASOUND IMAGING | Facility: HOSPITAL | Age: 38
End: 2023-04-05
Attending: OBSTETRICS & GYNECOLOGY
Payer: COMMERCIAL

## 2023-04-05 ENCOUNTER — OFFICE VISIT (OUTPATIENT)
Dept: MATERNAL FETAL MEDICINE | Facility: HOSPITAL | Age: 38
End: 2023-04-05
Attending: OBSTETRICS & GYNECOLOGY
Payer: COMMERCIAL

## 2023-04-05 DIAGNOSIS — O35.CXX0 PREGNANCY COMPLICATED BY FETAL LUNG LESION, SINGLE OR UNSPECIFIED FETUS: ICD-10-CM

## 2023-04-05 DIAGNOSIS — O40.3XX0 POLYHYDRAMNIOS, THIRD TRIMESTER, SINGLE GESTATION: ICD-10-CM

## 2023-04-05 DIAGNOSIS — O35.CXX0 PREGNANCY COMPLICATED BY FETAL LUNG LESION, SINGLE OR UNSPECIFIED FETUS: Primary | ICD-10-CM

## 2023-04-05 PROCEDURE — 76816 OB US FOLLOW-UP PER FETUS: CPT | Mod: 26 | Performed by: STUDENT IN AN ORGANIZED HEALTH CARE EDUCATION/TRAINING PROGRAM

## 2023-04-05 PROCEDURE — 99213 OFFICE O/P EST LOW 20 MIN: CPT | Mod: 25 | Performed by: STUDENT IN AN ORGANIZED HEALTH CARE EDUCATION/TRAINING PROGRAM

## 2023-04-05 PROCEDURE — 76816 OB US FOLLOW-UP PER FETUS: CPT

## 2023-04-05 PROCEDURE — G0463 HOSPITAL OUTPT CLINIC VISIT: HCPCS | Mod: 25 | Performed by: STUDENT IN AN ORGANIZED HEALTH CARE EDUCATION/TRAINING PROGRAM

## 2023-04-05 NOTE — PROGRESS NOTES
Please see the full imaging report from the ViewPoint program under the imaging tab.      Anastasia Guerrero MD  Maternal Fetal Medicine

## 2023-04-10 ENCOUNTER — TELEPHONE (OUTPATIENT)
Dept: SURGERY | Facility: CLINIC | Age: 38
End: 2023-04-10
Payer: COMMERCIAL

## 2023-04-10 NOTE — TELEPHONE ENCOUNTER
I spoke with mom by phone. Mom is currently 35 weeks pregnant. She is wondering what to do if baby is born before her May 3 prenatal consult with Dr. Lynn. She is scheduled to deliver at [facility]. Informed her that if baby is born prior to May 3rd appointment Dr. Lynn would see the baby in clinic after delivery and prenatal appointment would be cancelled. Mom will keep May 3rd appointment for now and reschedule if baby is born before then.

## 2023-04-10 NOTE — TELEPHONE ENCOUNTER
4/10 1st attempt.  LVM for patient to reschedule their 4/19 appt with Dr. Lynn.      Please assist patient in rescheduling when they call back.    Thank you,      Isabel Jj  Pediatric Specialty   [facility] [place]

## 2023-04-10 NOTE — TELEPHONE ENCOUNTER
[facility] Call Center    Phone Message    May a detailed message be left on voicemail: yes     Reason for Call: Other: Patient called to reschedule a prenatal appointment per the clinic's request. Because the appointment was pushed back, patient was wondering if it's still okay to be seen by Dr. Lynn still if the baby were to be delivered early. Please call back to discuss.      Action Taken: Message routed to:  Other: Peds Surgery [facility]    Travel Screening: Not Applicable

## 2023-04-19 ENCOUNTER — OFFICE VISIT (OUTPATIENT)
Dept: MATERNAL FETAL MEDICINE | Facility: HOSPITAL | Age: 38
End: 2023-04-19
Attending: OBSTETRICS & GYNECOLOGY
Payer: COMMERCIAL

## 2023-04-19 ENCOUNTER — ANCILLARY PROCEDURE (OUTPATIENT)
Dept: ULTRASOUND IMAGING | Facility: HOSPITAL | Age: 38
End: 2023-04-19
Attending: OBSTETRICS & GYNECOLOGY
Payer: COMMERCIAL

## 2023-04-19 DIAGNOSIS — O35.CXX0 PREGNANCY COMPLICATED BY FETAL LUNG LESION, SINGLE OR UNSPECIFIED FETUS: ICD-10-CM

## 2023-04-19 DIAGNOSIS — O35.CXX0 PREGNANCY COMPLICATED BY FETAL LUNG LESION, SINGLE OR UNSPECIFIED FETUS: Primary | ICD-10-CM

## 2023-04-19 PROCEDURE — 76816 OB US FOLLOW-UP PER FETUS: CPT

## 2023-04-19 PROCEDURE — 99213 OFFICE O/P EST LOW 20 MIN: CPT | Mod: 25 | Performed by: OBSTETRICS & GYNECOLOGY

## 2023-04-19 PROCEDURE — 76816 OB US FOLLOW-UP PER FETUS: CPT | Mod: 26 | Performed by: OBSTETRICS & GYNECOLOGY

## 2023-04-19 NOTE — NURSING NOTE
Patient reports + fetal movement, no pain, no contractions, leaking of fluid, or bleeding.   Patient denies headache, visual changes, nausea/vomiting, epigastric pain related to preeclampsia as she has history of this with first pregnancy.  SBAR given to ALETA SINHA, see their note in Epic.

## 2023-04-19 NOTE — PROGRESS NOTES
[facility] Clinic Visit    Saundra presents to [facility] clinic for ultrasound and recommendations. The following problems were addressed:    Fetal lung lesion    Tests Reviewed: prior US  Tests Ordered: follow up ultrasound  Unique Records reviewed: na    Impression:  1) Sorto intrauterine pregnancy at 36w 1d gestational age.   2) Again noted is a left sided CPAM, however this is very difficult to measure given advanced gestational age. CVR measured today at 0.39. None of the other anomalies commonly detected by ultrasound were evident in the limited fetal anatomic survey as described above, anatomy limited by gestational age and fetal lie.   3) Growth parameters and estimated fetal weight were consistent with a large for gestational age pattern of growth.  4) There is again mild polyhydramnios.  5) Normal fetal activity for gestational age.  6) There is no sonographic evidence of fetal hydrops.    Plan:  Thank-you for referring your patient to assess fetal growth.     I discussed the findings on today's ultrasound with the patient.  Saundra has a pediatric surgery consultation on 5/3. We will plan to see her back in 2 weeks to reevaluate CVR and evaluate for fetal hydrops. Weekly  testing is recommended in your office at 37 weeks due to maternal BMI > 35.    Return to primary provider for continued prenatal care.    If you have questions regarding today's evaluation or if we can be of further service, please contact the [facility].    **Fetal anomalies may be present but not detected**    Jazzy Whyte MD  Maternal Fetal Medicine    Total Time: 14 minutes spent on the date of the encounter doing chart review, history and exam, documentation and further activities as noted above.

## 2023-06-02 ENCOUNTER — TELEPHONE (OUTPATIENT)
Dept: FAMILY MEDICINE | Facility: CLINIC | Age: 38
End: 2023-06-02
Payer: COMMERCIAL

## 2023-06-02 ENCOUNTER — HOSPITAL ENCOUNTER (EMERGENCY)
Facility: HOSPITAL | Age: 38
Discharge: HOME | End: 2023-06-02
Attending: EMERGENCY MEDICINE | Admitting: EMERGENCY MEDICINE
Payer: COMMERCIAL

## 2023-06-02 VITALS
BODY MASS INDEX: 35.36 KG/M2 | HEIGHT: 66 IN | SYSTOLIC BLOOD PRESSURE: 130 MMHG | HEART RATE: 96 BPM | OXYGEN SATURATION: 97 % | WEIGHT: 220 LBS | DIASTOLIC BLOOD PRESSURE: 60 MMHG | RESPIRATION RATE: 20 BRPM | TEMPERATURE: 98.4 F

## 2023-06-02 DIAGNOSIS — Z20.9 EXPOSURE TO BAT WITHOUT KNOWN BITE: ICD-10-CM

## 2023-06-02 PROCEDURE — 90471 IMMUNIZATION ADMIN: CPT | Performed by: EMERGENCY MEDICINE

## 2023-06-02 PROCEDURE — 99284 EMERGENCY DEPT VISIT MOD MDM: CPT | Mod: 25

## 2023-06-02 PROCEDURE — 90675 RABIES VACCINE IM: CPT | Performed by: EMERGENCY MEDICINE

## 2023-06-02 PROCEDURE — 250N000011 HC RX IP 250 OP 636: Performed by: EMERGENCY MEDICINE

## 2023-06-02 RX ADMIN — RABIES VACCINE 1 ML: KIT at 11:40

## 2023-06-02 ASSESSMENT — ACTIVITIES OF DAILY LIVING (ADL): ADLS_ACUITY_SCORE: 33

## 2023-06-02 ASSESSMENT — ENCOUNTER SYMPTOMS
COUGH: 0
FEVER: 0
WOUND: 0

## 2023-06-02 NOTE — ED TRIAGE NOTES
Patient awoke with bat in home, here for vaccine. Patient 4 weeks post partum, c/s delivery, lochia minimal, pink in color incision intact. Denies pain

## 2023-06-02 NOTE — DISCHARGE INSTRUCTIONS
To prevent rabies, you must get 3 more shots of the rabies vaccine. If you don't, you may still develop rabies.    You will need an extra shot on Day-3, Day-7, and Day-14. Please follow the steps below.    Day 0 is the day you got your first rabies shot.   If Day 0 is Wednesday: Return to the Emergency Department on Saturday for your Day-3 shot. Then, get your Day-7 and Day-14 shots at one of the clinics listed below.  If Day 0 is Thursday: Return to the Emergency Department on Sunday for your Day-3 shot. Then, get your Day-7 and Day-14 shots at one of the clinics listed below.  If Day 0 is any other day, get all three follow-up shots (Days 3, 7, and 14) at one of the clinics listed below.   Call to schedule the rest of your shots at one of these primary care clinics: [facility] or [facility].Call [phone] (our Scheduling Center) Monday-Friday, 7 a.m. to 5 p.m.  Ask them to connect you to the Primary Care Team at the [facility] or [facility].   Once you're connected to the primary care team representative, tell them that you need to schedule your rabies shots with the medical assistant.  If you can't schedule your shots--or you need a shot on a weekend (Saturday or Sunday)--come back to the Emergency Department to get your shot. There may be a long wait in the Emergency Department.

## 2023-06-02 NOTE — TELEPHONE ENCOUNTER
Patient calls the clinic to discuss setting up her remainder of the rabies vaccine for herself and her 3 other family members as was exposed to a bat. Received the first shot today at the ER at [place], next dose (day 3) is on 6-5-23, day 7 is on 6-9-23 and last dose on day 14 is on 6-16-23.     Patient is sent to  to set up appointments for patient and her 3 other family members, patient is agreeable to this. Did message our [facility] team to make sure enough of the vaccine for up and coming appointments.       CLIVE Murphy

## 2023-06-02 NOTE — ED PROVIDER NOTES
EMERGENCY DEPARTMENT ENCOUNTER      NAME: Saundra Cronin  AGE: 38 year old female  YOB: 1985  MRN: [id number]  EVALUATION DATE & TIME: No admission date for patient encounter.    PCP: Stacie Glez    ED PROVIDER: Justo Young MD        Chief Complaint   Patient presents with     Bat Exposure         FINAL IMPRESSION:  1. Exposure to bat without known bite          ED COURSE & MEDICAL DECISION MAKING:    Pertinent Labs & Imaging studies reviewed. (See chart for details)  38 year old female presents to the Emergency Department for evaluation of exposure to bat last few nights.  Bat has been flying around in their rooms.  1 bat was killed.  1 bat escaped.  1 bat was captured.  No known bites.  Call primary care doctor and Department of Health who referred patient to ER for rabies vaccine.  Not on steroids.  No known contraindications to vaccine    Patient is breast-feeding        10:30 AM I met with the patient, obtained history, performed an initial exam, and discussed options and plan for diagnostics and treatment here in the ED.  10:40 AM Spoke with patient and family regarding next steps to take. She was in agreement with plan. The patient was discharged.    Medical Decision Making    History:    Supplemental history from: Family Member/Significant Other    External Record(s) reviewed: Documented in chart, if applicable.    Work Up:    Chart documentation includes differential considered and any EKGs or imaging independently interpreted by provider, where specified.    In additional to work up documented, I considered the following work up: Documented in chart, if applicable.    External consultation:    Discussion of management with another provider: Documented in chart, if applicable    Complicating factors:    Care impacted by chronic illness: N/A    Care affected by social determinants of health: N/A    Disposition considerations: Discharge. No recommendations on prescription strength  "medication(s). See documentation for any additional details.        Voice recognition software was used in the creation of this note. Any grammatical or nonsensical errors are due to inherent errors with the software and are not the intention of the writer.         At the conclusion of the encounter I discussed the results of all of the tests and the disposition. The questions were answered. The patient or family acknowledged understanding and was agreeable with the care plan.             MEDICATIONS GIVEN IN THE EMERGENCY:  Medications   rabies vaccine, PCEC (chick embryo derived) (RABAVERT) injection 1 mL (has no administration in time range)       NEW PRESCRIPTIONS STARTED AT TODAY'S ER VISIT  New Prescriptions    No medications on file          =================================================================    HPI    Triage note  \" \"      Patient information was obtained from: the patient     Use of : N/A     Saundra Cronin is a 38 year old female with a pertinent history of hyperlipidemia and uncomplicated asthma who presents to this ED for evaluation of exposure to a bat.     The patient reports that over the last three nights there have been multiple bats flying around their house. She is unsure of whether or not anybody in the family was bit as they were all asleep. They did kill one of the bats, but they believe that there were multiple others with unknown rabies status. The patient reports that she has no bites to her knowledge. She states that the called a pediatrician and the department of health and was directed to go to the emergency department. She denies having prior issues with taking vaccines. The patient denies usage of steroids but endorses usage of vitamins D and B12, Zoloft, zyrtec, prevacid, and prenatals. The patient also reports having an Augmentin allergy.     Denies fever, cough, or any other complaints at this time.    REVIEW OF SYSTEMS   Review of Systems   Constitutional: " "Negative for fever.   Respiratory: Negative for cough.    Skin: Negative for wound.      PAST MEDICAL HISTORY:  Past Medical History:   Diagnosis Date     Anxiety 12/11/2020     Endometriosis      Hyperlipidemia LDL goal <130 12/11/2020     Migraine 12/11/2020     Superficial thrombosis of leg 12/11/2020     Uncomplicated asthma     allergy related, no inhaler       PAST SURGICAL HISTORY:  Past Surgical History:   Procedure Laterality Date     DILATION AND CURETTAGE       DILATION AND CURETTAGE N/A 11/13/2018    Procedure: SUCTION DILATION AND CURETTAGE;  Surgeon: Hanna Kimbrough DO;  Location: [facility];  Service: Gynecology     LAPAROSCOPY      x2 for endometriosis     LAPAROSCOPY DIAGNOSTIC (GENERAL)       TONSILLECTOMY       TONSILLECTOMY             CURRENT MEDICATIONS:    aspirin 81 MG EC tablet  cetirizine (ZYRTEC) 10 MG tablet  co-enzyme Q-10 100 MG CAPS capsule  cyanocobalamin (VITAMIN B-12) 1000 MCG tablet  fluticasone (FLONASE) 50 MCG/ACT nasal spray  LANsoprazole (PREVACID) 30 MG DR capsule  Prenatal Vit-Fe Fumarate-FA (PRENATAL VITAMINS PO)  sertraline (ZOLOFT) 25 MG tablet  vitamin D2 (ERGOCALCIFEROL) 52343 units (1250 mcg) capsule        ALLERGIES:  Allergies   Allergen Reactions     Amoxicillin Hives     As a child     Amoxicillin-Pot Clavulanate Hives     As a child     Ceclor [Cefaclor] Hives     As a child       FAMILY HISTORY:  History reviewed. No pertinent family history.    SOCIAL HISTORY:   Social History     Socioeconomic History     Marital status:    Tobacco Use     Smoking status: Never     Smokeless tobacco: Never   Substance and Sexual Activity     Alcohol use: Not Currently     Drug use: Never       VITALS:  /60   Pulse 96   Temp 98.4  F (36.9  C)   Resp 20   Ht 1.664 m (5' 5.5\")   Wt 99.8 kg (220 lb)   LMP 08/09/2022 (Exact Date)   SpO2 97%   BMI 36.05 kg/m      PHYSICAL EXAM      Vitals: /60   Pulse 96   Temp 98.4  F (36.9  C)   Resp 20   " "Ht 1.664 m (5' 5.5\")   Wt 99.8 kg (220 lb)   LMP 08/09/2022 (Exact Date)   SpO2 97%   BMI 36.05 kg/m    General: Appears in no acute distress, awake, alert, interactive.  Eyes: Conjunctivae non-injected. Sclera anicteric.  HENT: Atraumatic.  Neck: Supple.  Respiratory/Chest: Respiration unlabored.  Abdomen: non distended  Musculoskeletal: Normal extremities. No edema or erythema.  Skin: Normal color. No rash or diaphoresis.  Neurologic: Face symmetric, moves all extremities spontaneously. Speech clear.  Psychiatric: Oriented to person, place, and time. Affect appropriate.     LAB:  All pertinent labs reviewed and interpreted.       RADIOLOGY:  Reviewed all pertinent imaging. Please see official radiology report.  No orders to display         I, Mickie Pacheco, am serving as a scribe to document services personally performed by Niranjan Young MD based on my observation and the provider's statements to me. I, Dr. Niranjan Young, attest that Mickie Pacheco is acting in a scribe capacity, has observed my performance of the services and has documented them in accordance with my direction.    Niranjan Young MD  Emergency Medicine  [facility] EMERGENCY DEPARTMENT  [facility] [facility] [address] [phone]     Niranjan Young MD  06/02/23 1100    "

## 2023-06-04 ENCOUNTER — HEALTH MAINTENANCE LETTER (OUTPATIENT)
Age: 38
End: 2023-06-04

## 2023-06-05 ENCOUNTER — ALLIED HEALTH/NURSE VISIT (OUTPATIENT)
Dept: FAMILY MEDICINE | Facility: CLINIC | Age: 38
End: 2023-06-05
Payer: COMMERCIAL

## 2023-06-05 DIAGNOSIS — Z23 ENCOUNTER FOR IMMUNIZATION: Primary | ICD-10-CM

## 2023-06-05 PROCEDURE — 99207 PR NO CHARGE NURSE ONLY: CPT

## 2023-06-05 PROCEDURE — 90471 IMMUNIZATION ADMIN: CPT

## 2023-06-05 PROCEDURE — 90675 RABIES VACCINE IM: CPT

## 2023-06-05 NOTE — PROGRESS NOTES
Prior to immunization administration, verified patients identity using patient s name and date of birth. Please see Immunization Activity for additional information.     Screening Questionnaire for Adult Immunization    Are you sick today?   No   Do you have allergies to medications, food, a vaccine component or latex?   Yes   Have you ever had a serious reaction after receiving a vaccination?   No   Do you have a long-term health problem with heart, lung, kidney, or metabolic disease (e.g., diabetes), asthma, a blood disorder, no spleen, complement component deficiency, a cochlear implant, or a spinal fluid leak?  Are you on long-term aspirin therapy?   No   Do you have cancer, leukemia, HIV/AIDS, or any other immune system problem?   No   Do you have a parent, brother, or sister with an immune system problem?   No   In the past 3 months, have you taken medications that affect  your immune system, such as prednisone, other steroids, or anticancer drugs; drugs for the treatment of rheumatoid arthritis, Crohn s disease, or psoriasis; or have you had radiation treatments?   No   Have you had a seizure, or a brain or other nervous system problem?   No   During the past year, have you received a transfusion of blood or blood    products, or been given immune (gamma) globulin or antiviral drug?   No   For women: Are you pregnant or is there a chance you could become       pregnant during the next month?   No   Have you received any vaccinations in the past 4 weeks?   No     Immunization questionnaire was positive for at least one answer.  Not contraindicated to vaccine given.    I have reviewed the following standing orders: Not Applicable; no option for rabies vaccine    Injection of rabies  given by Jesica Naranjo CMA. Patient instructed to remain in clinic for 15 minutes afterwards, and to report any adverse reactions.     Screening performed by Jesica Naranjo CMA on 6/5/2023 at 10:28 AM.

## 2023-06-09 ENCOUNTER — ALLIED HEALTH/NURSE VISIT (OUTPATIENT)
Dept: FAMILY MEDICINE | Facility: CLINIC | Age: 38
End: 2023-06-09
Payer: COMMERCIAL

## 2023-06-09 DIAGNOSIS — Z23 NEED FOR VACCINATION: Primary | ICD-10-CM

## 2023-06-09 PROCEDURE — 90675 RABIES VACCINE IM: CPT

## 2023-06-09 PROCEDURE — 99207 PR NO CHARGE NURSE ONLY: CPT

## 2023-06-09 PROCEDURE — 90471 IMMUNIZATION ADMIN: CPT

## 2023-06-09 NOTE — PROGRESS NOTES
Prior to immunization administration, verified patients identity using patient s name and date of birth. Please see Immunization Activity for additional information.     Screening Questionnaire for Adult Immunization    Are you sick today?   No   Do you have allergies to medications, food, a vaccine component or latex?   No   Have you ever had a serious reaction after receiving a vaccination?   No   Do you have a long-term health problem with heart, lung, kidney, or metabolic disease (e.g., diabetes), asthma, a blood disorder, no spleen, complement component deficiency, a cochlear implant, or a spinal fluid leak?  Are you on long-term aspirin therapy?   No   Do you have cancer, leukemia, HIV/AIDS, or any other immune system problem?   No   Do you have a parent, brother, or sister with an immune system problem?   No   In the past 3 months, have you taken medications that affect  your immune system, such as prednisone, other steroids, or anticancer drugs; drugs for the treatment of rheumatoid arthritis, Crohn s disease, or psoriasis; or have you had radiation treatments?   No   Have you had a seizure, or a brain or other nervous system problem?   No   During the past year, have you received a transfusion of blood or blood    products, or been given immune (gamma) globulin or antiviral drug?   No   For women: Are you pregnant or is there a chance you could become       pregnant during the next month?   No   Have you received any vaccinations in the past 4 weeks?   No     Immunization questionnaire answers were all negative.    I have reviewed the following standing orders: Not Applicable; RABIES    Injection of Rabies given by Natalia Wills CMA. Patient instructed to remain in clinic for 15 minutes afterwards, and to report any adverse reactions.     Screening performed by Natalia iWlls CMA on 6/9/2023 at 10:26 AM.

## 2023-06-16 ENCOUNTER — ALLIED HEALTH/NURSE VISIT (OUTPATIENT)
Dept: FAMILY MEDICINE | Facility: CLINIC | Age: 38
End: 2023-06-16
Payer: COMMERCIAL

## 2023-06-16 DIAGNOSIS — Z23 NEED FOR VACCINATION: Primary | ICD-10-CM

## 2023-06-16 PROCEDURE — 90471 IMMUNIZATION ADMIN: CPT

## 2023-06-16 PROCEDURE — 99207 PR NO CHARGE NURSE ONLY: CPT

## 2023-06-16 PROCEDURE — 90675 RABIES VACCINE IM: CPT

## 2023-06-16 NOTE — PROGRESS NOTES
Prior to immunization administration, verified patients identity using patient s name and date of birth. Please see Immunization Activity for additional information.     Screening Questionnaire for Adult Immunization    Are you sick today?   No   Do you have allergies to medications, food, a vaccine component or latex?   No   Have you ever had a serious reaction after receiving a vaccination?   No   Do you have a long-term health problem with heart, lung, kidney, or metabolic disease (e.g., diabetes), asthma, a blood disorder, no spleen, complement component deficiency, a cochlear implant, or a spinal fluid leak?  Are you on long-term aspirin therapy?   No   Do you have cancer, leukemia, HIV/AIDS, or any other immune system problem?   No   Do you have a parent, brother, or sister with an immune system problem?   No   In the past 3 months, have you taken medications that affect  your immune system, such as prednisone, other steroids, or anticancer drugs; drugs for the treatment of rheumatoid arthritis, Crohn s disease, or psoriasis; or have you had radiation treatments?   No   Have you had a seizure, or a brain or other nervous system problem?   No   During the past year, have you received a transfusion of blood or blood    products, or been given immune (gamma) globulin or antiviral drug?   No   For women: Are you pregnant or is there a chance you could become       pregnant during the next month?   No   Have you received any vaccinations in the past 4 weeks?   No     Immunization questionnaire answers were all negative.    I have reviewed the following standing orders: Not Applicable; Rabies    Patient instructed to remain in clinic for 15 minutes afterwards, and to report any adverse reactions.     Screening performed by Natalia Wills CMA on 6/16/2023 at 10:17 AM.

## 2024-07-14 ENCOUNTER — HEALTH MAINTENANCE LETTER (OUTPATIENT)
Age: 39
End: 2024-07-14

## 2025-06-07 ENCOUNTER — HEALTH MAINTENANCE LETTER (OUTPATIENT)
Age: 40
End: 2025-06-07

## 2025-06-16 ENCOUNTER — ANCILLARY PROCEDURE (OUTPATIENT)
Dept: MAMMOGRAPHY | Facility: CLINIC | Age: 40
End: 2025-06-16
Attending: OBSTETRICS & GYNECOLOGY
Payer: COMMERCIAL

## 2025-06-16 DIAGNOSIS — Z12.31 SCREENING MAMMOGRAM FOR BREAST CANCER: ICD-10-CM

## 2025-06-16 PROCEDURE — 77063 BREAST TOMOSYNTHESIS BI: CPT

## 2025-07-19 ENCOUNTER — HEALTH MAINTENANCE LETTER (OUTPATIENT)
Age: 40
End: 2025-07-19